# Patient Record
Sex: FEMALE | Race: WHITE | NOT HISPANIC OR LATINO | Employment: UNEMPLOYED | URBAN - METROPOLITAN AREA
[De-identification: names, ages, dates, MRNs, and addresses within clinical notes are randomized per-mention and may not be internally consistent; named-entity substitution may affect disease eponyms.]

---

## 2018-04-24 ENCOUNTER — HISTORICAL (OUTPATIENT)
Dept: ADMINISTRATIVE | Facility: HOSPITAL | Age: 33
End: 2018-04-24

## 2018-04-24 LAB
ABS NEUT (OLG): 4.13 X10(3)/MCL (ref 2.1–9.2)
ALBUMIN SERPL-MCNC: 3.5 GM/DL (ref 3.4–5)
ALBUMIN/GLOB SERPL: 1 RATIO (ref 1–2)
ALP SERPL-CCNC: 67 UNIT/L (ref 45–117)
ALT SERPL-CCNC: 21 UNIT/L (ref 12–78)
APPEARANCE, UA: CLEAR
AST SERPL-CCNC: 11 UNIT/L (ref 15–37)
BACTERIA #/AREA URNS AUTO: ABNORMAL /[HPF]
BASOPHILS # BLD AUTO: 0.03 X10(3)/MCL
BASOPHILS NFR BLD AUTO: 0 %
BILIRUB SERPL-MCNC: 1.1 MG/DL (ref 0.2–1)
BILIRUB UR QL STRIP: NEGATIVE
BILIRUBIN DIRECT+TOT PNL SERPL-MCNC: 0.2 MG/DL
BILIRUBIN DIRECT+TOT PNL SERPL-MCNC: 0.9 MG/DL
BUN SERPL-MCNC: 6 MG/DL (ref 7–18)
CALCIUM SERPL-MCNC: 9 MG/DL (ref 8.5–10.1)
CHLORIDE SERPL-SCNC: 108 MMOL/L (ref 98–107)
CO2 SERPL-SCNC: 27 MMOL/L (ref 21–32)
COLOR UR: YELLOW
CREAT SERPL-MCNC: 0.6 MG/DL (ref 0.6–1.3)
EOSINOPHIL # BLD AUTO: 0.17 X10(3)/MCL
EOSINOPHIL NFR BLD AUTO: 3 %
ERYTHROCYTE [DISTWIDTH] IN BLOOD BY AUTOMATED COUNT: 13.2 % (ref 11.5–14.5)
GLOBULIN SER-MCNC: 4.4 GM/ML (ref 2.3–3.5)
GLUCOSE (UA): NORMAL
GLUCOSE SERPL-MCNC: 100 MG/DL (ref 74–106)
HCT VFR BLD AUTO: 41.4 % (ref 35–46)
HGB BLD-MCNC: 13.7 GM/DL (ref 12–16)
HGB UR QL STRIP: 0.1 MG/DL
HYALINE CASTS #/AREA URNS LPF: ABNORMAL /[LPF]
IMM GRANULOCYTES # BLD AUTO: 0.01 10*3/UL
IMM GRANULOCYTES NFR BLD AUTO: 0 %
KETONES UR QL STRIP: ABNORMAL
LEUKOCYTE ESTERASE UR QL STRIP: 250 LEU/UL
LYMPHOCYTES # BLD AUTO: 1.75 X10(3)/MCL
LYMPHOCYTES NFR BLD AUTO: 27 % (ref 13–40)
MCH RBC QN AUTO: 28.2 PG (ref 26–34)
MCHC RBC AUTO-ENTMCNC: 33.1 GM/DL (ref 31–37)
MCV RBC AUTO: 85.2 FL (ref 80–100)
MONOCYTES # BLD AUTO: 0.36 X10(3)/MCL
MONOCYTES NFR BLD AUTO: 6 % (ref 4–12)
MUCOUS THREADS URNS QL MICRO: ABNORMAL
NEUTROPHILS # BLD AUTO: 4.13 X10(3)/MCL
NEUTROPHILS NFR BLD AUTO: 64 X10(3)/MCL
NITRITE UR QL STRIP: NEGATIVE
PH UR STRIP: 5.5 [PH] (ref 4.5–8)
PLATELET # BLD AUTO: 239 X10(3)/MCL (ref 130–400)
PMV BLD AUTO: 10.3 FL (ref 7.4–10.4)
POTASSIUM SERPL-SCNC: 3.9 MMOL/L (ref 3.5–5.1)
PROT SERPL-MCNC: 7.9 GM/DL (ref 6.4–8.2)
PROT UR QL STRIP: 20 MG/DL
RBC # BLD AUTO: 4.86 X10(6)/MCL (ref 4–5.2)
RBC #/AREA URNS AUTO: ABNORMAL /[HPF]
RHEUMATOID FACT SERPL-ACNC: <10 IU/ML (ref 0–15)
SODIUM SERPL-SCNC: 142 MMOL/L (ref 136–145)
SP GR UR STRIP: 1.02 (ref 1–1.03)
SQUAMOUS #/AREA URNS LPF: >100 /[LPF]
UROBILINOGEN UR STRIP-ACNC: NORMAL
WBC # SPEC AUTO: 6.4 X10(3)/MCL (ref 4.5–11)
WBC #/AREA URNS AUTO: ABNORMAL /HPF

## 2021-05-24 ENCOUNTER — HISTORICAL (OUTPATIENT)
Dept: LAB | Facility: HOSPITAL | Age: 36
End: 2021-05-24

## 2021-05-24 LAB
ABS NEUT (OLG): 4.84 X10(3)/MCL (ref 2.1–9.2)
ALBUMIN SERPL-MCNC: 3.6 GM/DL (ref 3.5–5)
ALBUMIN/GLOB SERPL: 0.9 RATIO (ref 1.1–2)
ALP SERPL-CCNC: 59 UNIT/L (ref 40–150)
ALT SERPL-CCNC: 18 UNIT/L (ref 0–55)
AST SERPL-CCNC: 15 UNIT/L (ref 5–34)
BASOPHILS # BLD AUTO: 0 X10(3)/MCL (ref 0–0.2)
BASOPHILS NFR BLD AUTO: 0 %
BILIRUB SERPL-MCNC: 1.4 MG/DL
BILIRUBIN DIRECT+TOT PNL SERPL-MCNC: 0.4 MG/DL (ref 0–0.5)
BILIRUBIN DIRECT+TOT PNL SERPL-MCNC: 1 MG/DL (ref 0–0.8)
BUN SERPL-MCNC: 8 MG/DL (ref 7–18.7)
CALCIUM SERPL-MCNC: 9.6 MG/DL (ref 8.4–10.2)
CHLORIDE SERPL-SCNC: 106 MMOL/L (ref 98–107)
CHOLEST SERPL-MCNC: 220 MG/DL
CHOLEST/HDLC SERPL: 4 {RATIO} (ref 0–5)
CO2 SERPL-SCNC: 26 MMOL/L (ref 22–29)
CREAT SERPL-MCNC: 0.74 MG/DL (ref 0.55–1.02)
EOSINOPHIL # BLD AUTO: 0.2 X10(3)/MCL (ref 0–0.9)
EOSINOPHIL NFR BLD AUTO: 2 %
ERYTHROCYTE [DISTWIDTH] IN BLOOD BY AUTOMATED COUNT: 13.2 % (ref 11.5–14.5)
ERYTHROCYTE [SEDIMENTATION RATE] IN BLOOD: 13 MM/HR (ref 0–20)
EST. AVERAGE GLUCOSE BLD GHB EST-MCNC: 96.8 MG/DL
GLOBULIN SER-MCNC: 3.9 GM/DL (ref 2.4–3.5)
GLUCOSE SERPL-MCNC: 96 MG/DL (ref 74–100)
HBA1C MFR BLD: 5 %
HCT VFR BLD AUTO: 41.3 % (ref 35–46)
HDLC SERPL-MCNC: 51 MG/DL (ref 35–60)
HGB BLD-MCNC: 13.5 GM/DL (ref 12–16)
IMM GRANULOCYTES # BLD AUTO: 0.03 10*3/UL
IMM GRANULOCYTES NFR BLD AUTO: 0 %
LDLC SERPL CALC-MCNC: 152 MG/DL (ref 50–140)
LYMPHOCYTES # BLD AUTO: 1.7 X10(3)/MCL (ref 0.6–4.6)
LYMPHOCYTES NFR BLD AUTO: 24 %
MCH RBC QN AUTO: 28.1 PG (ref 26–34)
MCHC RBC AUTO-ENTMCNC: 32.7 GM/DL (ref 31–37)
MCV RBC AUTO: 86 FL (ref 80–100)
MONOCYTES # BLD AUTO: 0.4 X10(3)/MCL (ref 0.1–1.3)
MONOCYTES NFR BLD AUTO: 5 %
NEUTROPHILS # BLD AUTO: 4.84 X10(3)/MCL (ref 2.1–9.2)
NEUTROPHILS NFR BLD AUTO: 68 %
NRBC BLD AUTO-RTO: 0 % (ref 0–0.2)
PLATELET # BLD AUTO: 266 X10(3)/MCL (ref 130–400)
PMV BLD AUTO: 9.8 FL (ref 7.4–10.4)
POTASSIUM SERPL-SCNC: 4.1 MMOL/L (ref 3.5–5.1)
PROT SERPL-MCNC: 7.5 GM/DL (ref 6.4–8.3)
RBC # BLD AUTO: 4.8 X10(6)/MCL (ref 4–5.2)
SODIUM SERPL-SCNC: 141 MMOL/L (ref 136–145)
TRIGL SERPL-MCNC: 84 MG/DL (ref 37–140)
TSH SERPL-ACNC: 1.32 UIU/ML (ref 0.35–4.94)
VLDLC SERPL CALC-MCNC: 17 MG/DL
WBC # SPEC AUTO: 7.1 X10(3)/MCL (ref 4.5–11)

## 2021-07-12 ENCOUNTER — HISTORICAL (OUTPATIENT)
Dept: ADMINISTRATIVE | Facility: HOSPITAL | Age: 36
End: 2021-07-12

## 2021-07-12 LAB
ABS NEUT (OLG): 5.54 X10(3)/MCL (ref 2.1–9.2)
BASOPHILS # BLD AUTO: 0 X10(3)/MCL (ref 0–0.2)
BASOPHILS NFR BLD AUTO: 0 %
EOSINOPHIL # BLD AUTO: 0.2 X10(3)/MCL (ref 0–0.9)
EOSINOPHIL NFR BLD AUTO: 2 %
ERYTHROCYTE [DISTWIDTH] IN BLOOD BY AUTOMATED COUNT: 13.1 % (ref 11.5–14.5)
HCT VFR BLD AUTO: 41.3 % (ref 35–46)
HGB BLD-MCNC: 13.7 GM/DL (ref 12–16)
IMM GRANULOCYTES # BLD AUTO: 0.03 10*3/UL
IMM GRANULOCYTES NFR BLD AUTO: 0 %
LYMPHOCYTES # BLD AUTO: 2.3 X10(3)/MCL (ref 0.6–4.6)
LYMPHOCYTES NFR BLD AUTO: 27 %
MCH RBC QN AUTO: 27.7 PG (ref 26–34)
MCHC RBC AUTO-ENTMCNC: 33.2 GM/DL (ref 31–37)
MCV RBC AUTO: 83.6 FL (ref 80–100)
MONOCYTES # BLD AUTO: 0.6 X10(3)/MCL (ref 0.1–1.3)
MONOCYTES NFR BLD AUTO: 6 %
NEUTROPHILS # BLD AUTO: 5.54 X10(3)/MCL (ref 2.1–9.2)
NEUTROPHILS NFR BLD AUTO: 64 %
NRBC BLD AUTO-RTO: 0 % (ref 0–0.2)
PLATELET # BLD AUTO: 261 X10(3)/MCL (ref 130–400)
PMV BLD AUTO: 10.1 FL (ref 7.4–10.4)
RBC # BLD AUTO: 4.94 X10(6)/MCL (ref 4–5.2)
WBC # SPEC AUTO: 8.6 X10(3)/MCL (ref 4.5–11)

## 2022-04-10 ENCOUNTER — HISTORICAL (OUTPATIENT)
Dept: ADMINISTRATIVE | Facility: HOSPITAL | Age: 37
End: 2022-04-10
Payer: MEDICAID

## 2022-04-27 VITALS
SYSTOLIC BLOOD PRESSURE: 122 MMHG | BODY MASS INDEX: 34.4 KG/M2 | HEIGHT: 64 IN | WEIGHT: 201.5 LBS | DIASTOLIC BLOOD PRESSURE: 87 MMHG | OXYGEN SATURATION: 97 %

## 2022-05-04 NOTE — HISTORICAL [facility]
This is a historical note converted from [place]. Formatting and pictures may have been removed.  Please reference [place] for original formatting and attached multimedia. Chief Complaint  Needs pcp  History of Present Illness  33-year-old white female presents to establish care in the internal medicine clinic. ?She has a past medical history of?postural orthostatic tachycardia syndrome,?Lyme disease?treated?over 5 years ago,?rheumatoid arthritis diagnosed about 10 years ago, bipolar disorder type II, history of opioid abuse?currently under the care of a psychiatrist with Suboxone.  Dr. Kedar Flores (psychiatrist): Bipolar disorder II, Hx of opioid abuse; 3 years sober from?Roxicodone, Lortab, Vicodin, oxycodone, etc.  She has not had a Pap smear in about 2 years but says that there is a clinic?she goes to in [place]?where she can get a same-day appointment for a Pap smear.? However, she would like to establish care in the gynecology clinic at this facility.  In regards to her history of rheumatoid arthritis, patient states she was diagnosed and treated by Dr. Nieves (rheumatologist) about 10 years ago at which time?she was started on Plaquenil?and?Mobic.? She discontinued all medications?and shortly thereafter was diagnosed with Lyme disease in [place].? Patient says she traveled?throughout the [facility], and spent a significant amount of time in [place]and?[place]regularly?just prior to her diagnosis.  She is requesting a referral to the dermatology clinic for skin check.  Review of Systems  ????Constitutional: No fever, No chills, No weakness, No fatigue.  ?????Eye: No recent visual problem.  ?????Respiratory: No shortness of breath, No cough, No sputum production, No wheezing.  ?????Cardiovascular: No chest pain, No palpitations, No peripheral edema.  ?????Gastrointestinal: No nausea, No vomiting, No diarrhea, No constipation, No heartburn, No abdominal pain.  ?????Genitourinary: No  dysuria.  ?????Endocrine: No excessive thirst, No polyuria, No cold intolerance, No heat intolerance.  ?????Musculoskeletal: No back pain, No joint pain, No decreased range of motion.  ?????Integumentary: No rash, No pruritus.  ?????Neurologic: Alert and oriented X4, No numbness, No tingling, No headache.  ?????Psychiatric: No anxiety, No depression.  Physical Exam  Vitals & Measurements  T:?37.0? ?C (Oral)? HR:?76(Peripheral)? RR:?18? BP:?119/82?  HT:?162?cm? HT:?162?cm? HT:?162?cm? WT:?91.7?kg? WT:?91.7?kg? WT:?91.7?kg? BMI:?34.94?  General: Alert and oriented, No acute distress.  ?????Eye: Pupils are equal, round and reactive to light, Extraocular movements are intact, Normal conjunctiva.  ?????HENT: Normocephalic, Oral mucosa is moist, No pharyngeal erythema.  ?????Neck: Supple, Non-tender.  ?????Respiratory: Lungs are clear to auscultation, Respirations are non-labored.  ?????Cardiovascular: Normal rate, Regular rhythm, No murmur, Good pulses equal in all extremities, No edema.  ?????Gastrointestinal: Soft, Non-tender, Non-distended, Normal bowel sounds.  ?????Musculoskeletal: Normal range of motion, Normal strength.  ?????Integumentary: Warm, Dry. Diffuse sun damage with hyperpigmentation.  ?????Neurologic: Alert, Oriented.  ?????Cognition and Speech: Oriented, Speech clear and coherent.  ?????Psychiatric: Cooperative, Appropriate mood & affect.  Assessment/Plan  Ordered:  CBC w/ Auto Diff, Routine collect, *Est. 04/24/18 3:00:00 CDT, Blood, Order for future visit, *Est. Stop date 04/24/18 3:00:00 CDT, Lab Collect, Wellness examination, 04/24/18 8:52:00 CDT  Clinic Follow up, *Est. 10/24/18 10:50:00 CDT, Order for future visit, Wellness examination, [facility]  Comprehensive Metabolic Panel, Routine collect, *Est. 04/24/18 3:00:00 CDT, Blood, Order for future visit, *Est. Stop date 04/24/18 3:00:00 CDT, Lab Collect, Wellness examination, 04/24/18 8:52:00 CDT  Cyclic Citrullinated Peptide (CCP) Abs, IgA,  IgG-[facility] [id number], Routine collect, *Est. 04/24/18 3:00:00 CDT, Blood, Order for future visit, *Est. Stop date 04/24/18 3:00:00 CDT, Lab Collect, History of rheumatoid arthritis, 04/24/18 9:12:00 CDT  Internal Referral to Dermatology, Skin check per patient request, *Est. 05/24/18 3:00:00 CDT, Future Visit?, Sun-damaged skin  Internal Referral to Gynecology, Pap smear, *Est. 05/24/18 3:00:00 CDT, Future Visit?, Cervical cancer screening  Rheumatoid Factor Quantitative, Routine collect, *Est. 04/24/18 3:00:00 CDT, Blood, Order for future visit, *Est. Stop date 04/24/18 3:00:00 CDT, Lab Collect, History of rheumatoid arthritis, 04/24/18 9:12:00 CDT  Urinalysis with Microscopic if Indicated, Routine collect, Urine, Order for future visit, *Est. 04/24/18 3:00:00 CDT, *Est. Stop date 04/24/18 3:00:00 CDT, Nurse collect, Wellness examination, 04/24/18 8:52:00 CDT  XR Hip Left 2 Views, Routine, *Est. 04/24/18 3:00:00 CDT, Pain, None, Ambulatory, Rad Type, Order for future visit, Left hip pain, *Est. 04/24/18 3:00:00 CDT  ?  1.? Wellness:?Labs?as above today. Follow-up in 6 months.  2.? History of rheumatoid arthritis:?Rheumatoid factor and anti-CCP today.  3.? Bipolar 2 disorder, history of opioid abuse: Keep scheduled follow-up appointments with psychiatrist, Dr. Flores.  4.? Skin check:?Referral to dermatology clinic?but also advised patient to call her insurance and see what dermatologist are?covered, I will be happy to refer her accordingly.  ?   Screening: Referral to gynecology clinic but patient understands?he can take at least 6 months to get into this clinic and I advised her?to have a Pap smear done with her previous nurse practitioner.  ?  Patient voiced understanding.   Problem List/Past Medical History  Ongoing  History of Lyme disease  Obesity  POTS (postural orthostatic tachycardia syndrome)  Historical  No qualifying data  Procedure/Surgical History  None.  Medications  indomethacin 25 mg oral capsule,  25 mg= 1 cap(s), Oral, TID, PRN,? ?Not taking  L-Methylfolate Formula 15 mg oral capsule, 15 mg= 1 cap(s), Oral, Daily  magnesium 100 mg oral tablet  SUBOXONE 2MG/0.5MG DISSOLVING FILM, 1 EA, SL, Daily  Wellbutrin  mg/12 hours oral tablet, extended release, 150 mg= 1 tab(s), Oral, Daily  Zonegran 100 mg oral capsule, 200 mg= 2 cap(s), Oral, Daily  Allergies  sulfa drugs?(hives)  Social History  Alcohol  Past, 1-2 times per month, 04/24/2018  Employment/School  Employed, Highest education level: University degree(s)., 04/24/2018  Home/Environment  Lives with Alone. Living situation: Home/Independent. Alcohol abuse in household: No. Substance abuse in household: No. Smoker in household: No. Feels unsafe at home: No. Safe place to go: Yes. Family/Friends available for support: Yes. Concern for family members at home: No. Major illness in household: No., 04/24/2018  Nutrition/Health  Vegan, Wants to lose weight: Yes. Sleeping concerns: No. Feels highly stressed: No., 04/24/2018  Substance Abuse  Past, Prescription medications, Started age 27 Years. Stopped age 30 Years., 04/24/2018  Tobacco  Never smoker, 04/17/2016  Family History  Autoimmune disease: Mother.  Clotting disorder.: Father.  Depression.: Father.  Hyperlipidemia.: Mother.  Hypertension.: Mother.  Thyroid cancer.: Mother.      5. ?Left hip pain ?6 months: X-ray today. ?Advised on over-the-counter?analgesics as needed.

## 2022-07-19 RX ORDER — BUPRENORPHINE HYDROCHLORIDE, NALOXONE HYDROCHLORIDE 2; .5 MG/1; MG/1
FILM, SOLUBLE BUCCAL; SUBLINGUAL
COMMUNITY
Start: 2022-05-04 | End: 2023-10-26

## 2022-07-19 RX ORDER — ZONISAMIDE 100 MG/1
300 CAPSULE ORAL DAILY
COMMUNITY
Start: 2022-06-05 | End: 2023-04-26 | Stop reason: ALTCHOICE

## 2022-07-19 RX ORDER — OMEPRAZOLE 40 MG/1
40 CAPSULE, DELAYED RELEASE ORAL DAILY
COMMUNITY
Start: 2022-06-05 | End: 2022-07-20 | Stop reason: SDUPTHER

## 2022-07-19 RX ORDER — HYDROXYCHLOROQUINE SULFATE 200 MG/1
200 TABLET, FILM COATED ORAL 2 TIMES DAILY
COMMUNITY
Start: 2022-03-08 | End: 2022-07-20 | Stop reason: SDUPTHER

## 2022-07-19 RX ORDER — OFLOXACIN 3 MG/ML
SOLUTION AURICULAR (OTIC)
COMMUNITY
Start: 2022-06-01 | End: 2022-07-20 | Stop reason: ALTCHOICE

## 2022-07-19 RX ORDER — TIZANIDINE 4 MG/1
4 TABLET ORAL NIGHTLY
COMMUNITY
Start: 2022-03-08 | End: 2022-07-20 | Stop reason: SDUPTHER

## 2022-07-19 RX ORDER — LEVONORGESTREL AND ETHINYL ESTRADIOL AND ETHINYL ESTRADIOL 150-30(84)
1 KIT ORAL DAILY
COMMUNITY
Start: 2022-06-01 | End: 2023-10-26

## 2022-07-19 RX ORDER — MELOXICAM 15 MG/1
15 TABLET ORAL DAILY
COMMUNITY
Start: 2022-03-08 | End: 2022-07-20 | Stop reason: SDUPTHER

## 2022-07-19 RX ORDER — BUPROPION HYDROCHLORIDE 150 MG/1
150 TABLET ORAL EVERY MORNING
COMMUNITY
Start: 2022-04-26 | End: 2022-07-20 | Stop reason: ALTCHOICE

## 2022-07-19 RX ORDER — ASPIRIN 325 MG
50000 TABLET, DELAYED RELEASE (ENTERIC COATED) ORAL
COMMUNITY
Start: 2022-03-01 | End: 2022-07-20 | Stop reason: SDUPTHER

## 2022-07-19 RX ORDER — LEFLUNOMIDE 20 MG/1
20 TABLET ORAL
COMMUNITY
Start: 2021-12-01 | End: 2022-07-20

## 2022-07-19 RX ORDER — LISDEXAMFETAMINE DIMESYLATE 40 MG/1
40 CAPSULE ORAL EVERY MORNING
COMMUNITY
Start: 2022-04-28 | End: 2023-04-26 | Stop reason: ALTCHOICE

## 2022-07-19 RX ORDER — ROSUVASTATIN CALCIUM 10 MG/1
10 TABLET, COATED ORAL
COMMUNITY
Start: 2021-07-12 | End: 2023-04-26 | Stop reason: ALTCHOICE

## 2022-07-19 RX ORDER — METHYLPHENIDATE HYDROCHLORIDE 10 MG/1
TABLET ORAL
COMMUNITY
Start: 2022-04-28 | End: 2023-10-26

## 2022-07-20 ENCOUNTER — OFFICE VISIT (OUTPATIENT)
Dept: RHEUMATOLOGY | Facility: CLINIC | Age: 37
End: 2022-07-20
Payer: MEDICAID

## 2022-07-20 VITALS
SYSTOLIC BLOOD PRESSURE: 112 MMHG | BODY MASS INDEX: 29.24 KG/M2 | DIASTOLIC BLOOD PRESSURE: 78 MMHG | WEIGHT: 175.5 LBS | OXYGEN SATURATION: 97 % | RESPIRATION RATE: 18 BRPM | HEIGHT: 65 IN | HEART RATE: 104 BPM | TEMPERATURE: 98 F

## 2022-07-20 DIAGNOSIS — M05.9 SEROPOSITIVE RHEUMATOID ARTHRITIS: Primary | ICD-10-CM

## 2022-07-20 DIAGNOSIS — F41.1 GAD (GENERALIZED ANXIETY DISORDER): ICD-10-CM

## 2022-07-20 DIAGNOSIS — Z15.89 MTHFR GENE MUTATION: ICD-10-CM

## 2022-07-20 DIAGNOSIS — U09.9 POST-COVID SYNDROME: ICD-10-CM

## 2022-07-20 DIAGNOSIS — M79.7 FIBROMYALGIA: ICD-10-CM

## 2022-07-20 DIAGNOSIS — F51.01 PRIMARY INSOMNIA: ICD-10-CM

## 2022-07-20 PROCEDURE — 3074F SYST BP LT 130 MM HG: CPT | Mod: CPTII,,, | Performed by: INTERNAL MEDICINE

## 2022-07-20 PROCEDURE — 3074F PR MOST RECENT SYSTOLIC BLOOD PRESSURE < 130 MM HG: ICD-10-PCS | Mod: CPTII,,, | Performed by: INTERNAL MEDICINE

## 2022-07-20 PROCEDURE — 1160F PR REVIEW ALL MEDS BY PRESCRIBER/CLIN PHARMACIST DOCUMENTED: ICD-10-PCS | Mod: CPTII,,, | Performed by: INTERNAL MEDICINE

## 2022-07-20 PROCEDURE — 1159F PR MEDICATION LIST DOCUMENTED IN MEDICAL RECORD: ICD-10-PCS | Mod: CPTII,,, | Performed by: INTERNAL MEDICINE

## 2022-07-20 PROCEDURE — 3078F DIAST BP <80 MM HG: CPT | Mod: CPTII,,, | Performed by: INTERNAL MEDICINE

## 2022-07-20 PROCEDURE — 3008F PR BODY MASS INDEX (BMI) DOCUMENTED: ICD-10-PCS | Mod: CPTII,,, | Performed by: INTERNAL MEDICINE

## 2022-07-20 PROCEDURE — 3078F PR MOST RECENT DIASTOLIC BLOOD PRESSURE < 80 MM HG: ICD-10-PCS | Mod: CPTII,,, | Performed by: INTERNAL MEDICINE

## 2022-07-20 PROCEDURE — 1160F RVW MEDS BY RX/DR IN RCRD: CPT | Mod: CPTII,,, | Performed by: INTERNAL MEDICINE

## 2022-07-20 PROCEDURE — 99214 OFFICE O/P EST MOD 30 MIN: CPT | Mod: S$PBB,,, | Performed by: INTERNAL MEDICINE

## 2022-07-20 PROCEDURE — 1159F MED LIST DOCD IN RCRD: CPT | Mod: CPTII,,, | Performed by: INTERNAL MEDICINE

## 2022-07-20 PROCEDURE — 99214 OFFICE O/P EST MOD 30 MIN: CPT | Mod: PBBFAC | Performed by: INTERNAL MEDICINE

## 2022-07-20 PROCEDURE — 3008F BODY MASS INDEX DOCD: CPT | Mod: CPTII,,, | Performed by: INTERNAL MEDICINE

## 2022-07-20 PROCEDURE — 99214 PR OFFICE/OUTPT VISIT, EST, LEVL IV, 30-39 MIN: ICD-10-PCS | Mod: S$PBB,,, | Performed by: INTERNAL MEDICINE

## 2022-07-20 RX ORDER — IBUPROFEN 600 MG/1
600 TABLET ORAL
COMMUNITY
Start: 2022-06-28 | End: 2022-07-20

## 2022-07-20 RX ORDER — ASPIRIN 325 MG
50000 TABLET, DELAYED RELEASE (ENTERIC COATED) ORAL
Qty: 5 CAPSULE | Refills: 5 | Status: SHIPPED | OUTPATIENT
Start: 2022-07-20 | End: 2024-02-27

## 2022-07-20 RX ORDER — MELOXICAM 15 MG/1
15 TABLET ORAL DAILY
Qty: 30 EACH | Refills: 5 | Status: SHIPPED | OUTPATIENT
Start: 2022-07-20 | End: 2022-09-26

## 2022-07-20 RX ORDER — OMEPRAZOLE 40 MG/1
40 CAPSULE, DELAYED RELEASE ORAL DAILY
Qty: 30 CAPSULE | Refills: 5 | Status: SHIPPED | OUTPATIENT
Start: 2022-07-20 | End: 2023-04-26 | Stop reason: ALTCHOICE

## 2022-07-20 RX ORDER — PENICILLIN V POTASSIUM 500 MG/1
500 TABLET, FILM COATED ORAL 4 TIMES DAILY
COMMUNITY
Start: 2022-06-28 | End: 2022-07-20 | Stop reason: ALTCHOICE

## 2022-07-20 RX ORDER — TIZANIDINE 4 MG/1
4 TABLET ORAL NIGHTLY
Qty: 30 TABLET | Refills: 5 | Status: SHIPPED | OUTPATIENT
Start: 2022-07-20 | End: 2023-04-26 | Stop reason: ALTCHOICE

## 2022-07-20 RX ORDER — METHYLPREDNISOLONE 4 MG/1
TABLET ORAL
COMMUNITY
Start: 2022-06-29 | End: 2022-07-20 | Stop reason: ALTCHOICE

## 2022-07-20 RX ORDER — HYDROXYCHLOROQUINE SULFATE 200 MG/1
200 TABLET, FILM COATED ORAL 2 TIMES DAILY
Qty: 60 TABLET | Refills: 5 | Status: SHIPPED | OUTPATIENT
Start: 2022-07-20 | End: 2023-04-26 | Stop reason: SDUPTHER

## 2022-07-20 NOTE — PROGRESS NOTES
"Subjective:       Patient ID: Bernice Joshi is a 37 y.o. female.    Chief Complaint: Rheumatoid Arthritis and Fibromyalgia    The patient is complaining of joint pain involving the MCP PIP wrist elbow shoulders hips knees and ankles bilaterally.  The pain is 9/10 in intensity dull in quality and continuous.  That is associated with a morning stiffness lasting for more than 60 minutes.  Is also having difficulty maintaining a good night of sleep.  This has been associated with myalgias.  Muscle aches are 9/10 in intensity dull in quality and continuous.  They are associated with fatigue.  No fever no chills no others.      Review of Systems   Constitutional: Negative for appetite change, chills, fever and unexpected weight change.   HENT: Negative for congestion, ear pain, mouth sores, nosebleeds and trouble swallowing.    Eyes: Negative for photophobia, discharge and redness.   Respiratory: Negative for cough, chest tightness and shortness of breath.    Cardiovascular: Negative for chest pain.   Gastrointestinal: Negative for abdominal pain, constipation, diarrhea and vomiting.   Endocrine: Negative.    Genitourinary: Negative for dysuria, genital sores and hematuria.   Musculoskeletal:        As per HPI   Skin: Negative for rash.   Neurological: Negative for weakness and headaches.   Hematological: Bruises/bleeds easily.         Objective:   /78   Pulse 104   Temp 98.3 °F (36.8 °C) (Oral)   Resp 18   Ht 5' 4.5" (1.638 m)   Wt 79.6 kg (175 lb 7.8 oz)   LMP 07/18/2022   SpO2 97%   BMI 29.66 kg/m²      Physical Exam   Constitutional: She is oriented to person, place, and time. She appears well-developed and well-nourished. No distress.   HENT:   Head: Normocephalic and atraumatic.   Right Ear: External ear normal.   Left Ear: External ear normal.   Eyes: Pupils are equal, round, and reactive to light.   Cardiovascular: Normal rate, regular rhythm and normal heart sounds.   Pulmonary/Chest: Breath " sounds normal.   Abdominal: Soft. There is no abdominal tenderness.   Musculoskeletal:      Right shoulder: Tenderness present.      Left shoulder: Tenderness present.      Right elbow: Tenderness present.      Left elbow: Tenderness present.      Right wrist: Tenderness present.      Left wrist: Tenderness present.      Cervical back: Neck supple.      Right hip: Tenderness present.      Left hip: Tenderness present.      Right knee: Tenderness present.      Left knee: Tenderness present.      Right ankle: Tenderness present.      Left ankle: Tenderness present.   Lymphadenopathy:     She has no cervical adenopathy.   Neurological: She is alert and oriented to person, place, and time. She displays normal reflexes. No cranial nerve deficit or sensory deficit. She exhibits normal muscle tone. Coordination normal.   Skin: No rash noted. No erythema.   Vitals reviewed.      Right Side Rheumatological Exam     The patient is tender to palpation of the shoulder, elbow, wrist, knee, 1st PIP, 1st MCP, 2nd PIP, 2nd MCP, 3rd PIP, 3rd MCP, 4th PIP, 4th MCP, 5th PIP, hip, ankle, 1st MTP, 2nd MTP, 3rd MTP, 4th MTP, 5th MTP, 1st toe IP, 2nd toe IP, 3rd toe IP, 4th toe IP and 5th toe IP    Left Side Rheumatological Exam     The patient is tender to palpation of the shoulder, elbow, wrist, knee, 1st PIP, 1st MCP, 2nd PIP, 2nd MCP, 3rd PIP, 3rd MCP, 4th PIP, 4th MCP, 5th PIP, 5th MCP, hip, ankle, 1st MTP, 2nd MTP, 3rd MTP, 4th MTP, 5th MTP, 1st toe IP, 2nd toe IP, 3rd toe IP, 4th toe IP and 5th toe IP.         Completed Fibromyalgia exam 18/18 tender points.  No data to display     Assessment:       1. Seropositive rheumatoid arthritis    2. Fibromyalgia    3. Post-COVID syndrome    4. Primary insomnia    5. KHADIJAH (generalized anxiety disorder)    6. MTHFR gene mutation            Plan:       Problem List Items Addressed This Visit        Immunology/Multi System    Seropositive rheumatoid arthritis - Primary    Relevant Medications     cholecalciferol, vitamin D3, 1,250 mcg (50,000 unit) capsule    hydrOXYchloroQUINE (PLAQUENIL) 200 mg tablet    meloxicam (MOBIC) 15 MG tablet    omeprazole (PRILOSEC) 40 MG capsule    tiZANidine (ZANAFLEX) 4 MG tablet    folic acid-vit B6-vit B12 2.5-25-2 mg (FOLBIC OR EQUIV) 2.5-25-2 mg Tab    Other Relevant Orders    CBC Auto Differential    Comprehensive Metabolic Panel    CRP, High Sensitivity    Vitamin D    Urinalysis    Antinuclear Antibody (MAK), HEp-2, IgG    Ambulatory Referral/consult to [facility]       Orthopedic    Fibromyalgia    Relevant Medications    cholecalciferol, vitamin D3, 1,250 mcg (50,000 unit) capsule    hydrOXYchloroQUINE (PLAQUENIL) 200 mg tablet    meloxicam (MOBIC) 15 MG tablet    omeprazole (PRILOSEC) 40 MG capsule    tiZANidine (ZANAFLEX) 4 MG tablet    folic acid-vit B6-vit B12 2.5-25-2 mg (FOLBIC OR EQUIV) 2.5-25-2 mg Tab    Other Relevant Orders    CBC Auto Differential    Comprehensive Metabolic Panel    CRP, High Sensitivity    Vitamin D    Urinalysis    Antinuclear Antibody (MAK), HEp-2, IgG    Ambulatory Referral/consult to [facility]      Other Visit Diagnoses     Post-COVID syndrome        Relevant Medications    cholecalciferol, vitamin D3, 1,250 mcg (50,000 unit) capsule    hydrOXYchloroQUINE (PLAQUENIL) 200 mg tablet    meloxicam (MOBIC) 15 MG tablet    omeprazole (PRILOSEC) 40 MG capsule    tiZANidine (ZANAFLEX) 4 MG tablet    folic acid-vit B6-vit B12 2.5-25-2 mg (FOLBIC OR EQUIV) 2.5-25-2 mg Tab    Other Relevant Orders    CBC Auto Differential    Comprehensive Metabolic Panel    CRP, High Sensitivity    Vitamin D    Urinalysis    Antinuclear Antibody (MAK), HEp-2, IgG    Ambulatory Referral/consult to [facility]    Primary insomnia        Relevant Medications    cholecalciferol, vitamin D3, 1,250 mcg (50,000 unit) capsule    hydrOXYchloroQUINE (PLAQUENIL) 200 mg tablet     meloxicam (MOBIC) 15 MG tablet    omeprazole (PRILOSEC) 40 MG capsule    tiZANidine (ZANAFLEX) 4 MG tablet    folic acid-vit B6-vit B12 2.5-25-2 mg (FOLBIC OR EQUIV) 2.5-25-2 mg Tab    Other Relevant Orders    CBC Auto Differential    Comprehensive Metabolic Panel    CRP, High Sensitivity    Vitamin D    Urinalysis    Antinuclear Antibody (MAK), HEp-2, IgG    Ambulatory Referral/consult to [facility]    KHADIJAH (generalized anxiety disorder)        Relevant Medications    cholecalciferol, vitamin D3, 1,250 mcg (50,000 unit) capsule    hydrOXYchloroQUINE (PLAQUENIL) 200 mg tablet    meloxicam (MOBIC) 15 MG tablet    omeprazole (PRILOSEC) 40 MG capsule    tiZANidine (ZANAFLEX) 4 MG tablet    folic acid-vit B6-vit B12 2.5-25-2 mg (FOLBIC OR EQUIV) 2.5-25-2 mg Tab    Other Relevant Orders    CBC Auto Differential    Comprehensive Metabolic Panel    CRP, High Sensitivity    Vitamin D    Urinalysis    Antinuclear Antibody (MAK), HEp-2, IgG    Ambulatory Referral/consult to [facility]    MTHFR gene mutation        Relevant Medications    cholecalciferol, vitamin D3, 1,250 mcg (50,000 unit) capsule    hydrOXYchloroQUINE (PLAQUENIL) 200 mg tablet    meloxicam (MOBIC) 15 MG tablet    omeprazole (PRILOSEC) 40 MG capsule    tiZANidine (ZANAFLEX) 4 MG tablet    folic acid-vit B6-vit B12 2.5-25-2 mg (FOLBIC OR EQUIV) 2.5-25-2 mg Tab    Other Relevant Orders    CBC Auto Differential    Comprehensive Metabolic Panel    CRP, High Sensitivity    Vitamin D    Urinalysis    Antinuclear Antibody (MAK), HEp-2, IgG    Ambulatory Referral/consult to [facility]

## 2022-09-12 ENCOUNTER — TELEPHONE (OUTPATIENT)
Dept: RHEUMATOLOGY | Facility: CLINIC | Age: 37
End: 2022-09-12
Payer: MEDICAID

## 2022-09-12 DIAGNOSIS — M53.3 SACROILIAC JOINT PAIN: Primary | ICD-10-CM

## 2022-09-12 NOTE — TELEPHONE ENCOUNTER
Called pt re: PT referral and where to send it  We usually send Medicaid PT to [facility], want to confirm pt agreeable  LVM asking for call back

## 2022-09-19 NOTE — TELEPHONE ENCOUNTER
Called pt again re: where to send PT referral  LVM that unless I hear back that she wants referral sent somewhere else we'll send it to [facility]

## 2022-09-26 ENCOUNTER — TELEPHONE (OUTPATIENT)
Dept: RHEUMATOLOGY | Facility: CLINIC | Age: 37
End: 2022-09-26
Payer: MEDICAID

## 2022-09-26 DIAGNOSIS — M79.7 FIBROMYALGIA: ICD-10-CM

## 2022-09-26 DIAGNOSIS — M06.9 RHEUMATOID ARTHRITIS, INVOLVING UNSPECIFIED SITE, UNSPECIFIED WHETHER RHEUMATOID FACTOR PRESENT: Primary | ICD-10-CM

## 2022-09-26 RX ORDER — PREDNISONE 5 MG/1
5 TABLET ORAL DAILY
Qty: 30 TABLET | Refills: 5 | Status: SHIPPED | OUTPATIENT
Start: 2022-09-26 | End: 2023-04-26 | Stop reason: ALTCHOICE

## 2022-09-26 NOTE — TELEPHONE ENCOUNTER
Also from [facility]: Pt called and stated she had a v/m from [facility] about PT a week ago but she was on vacation at that time. She called today and stated [facility] in [place] is good for her. Pt can be reached @ [phone]

## 2022-09-26 NOTE — TELEPHONE ENCOUNTER
----- Message from Eunice Calle sent at 9/23/2022 11:44 AM CDT -----  Regarding: Pt needs a call  Pt called Clinic stating that she is having SEVERE BRUISING. Pt thinks it's from her medication. Please Advise [phone]    Thank You

## 2022-09-26 NOTE — TELEPHONE ENCOUNTER
Orders for PT faxed to [facility] in [place] at this time. Attempted to contact patient. Left voicemail for return call.

## 2022-09-26 NOTE — TELEPHONE ENCOUNTER
Spoke to patient. Reports tiny, pinpoint bruising to bilateral thigh areas. She also reports multiple, larger, generalized bruises to bilateral lower extremities. States that at her last appointment with Dr. Disla (7/20/22), he wanted to order labs but decided against it when the patient admitted to a recent bought with Covid. She is asking if Dr. Disla would now like to order labs. Orders for labs placed at this time (based on last ov note). Will confer with Dr. Disla. Her next scheduled rheumatology appointment is 4/12/23.

## 2022-09-26 NOTE — TELEPHONE ENCOUNTER
Left message stating that lab orders have been placed and medication changes have been made. Requested callback.

## 2022-09-27 NOTE — TELEPHONE ENCOUNTER
Attempted to contact patient. Left a more detailed message as requested by patient. Medication instructions given and instructed to have labs drawn, as well as to have UA done. Instructed to call if needed.

## 2022-10-10 ENCOUNTER — LAB VISIT (OUTPATIENT)
Dept: LAB | Facility: HOSPITAL | Age: 37
End: 2022-10-10
Attending: INTERNAL MEDICINE
Payer: MEDICAID

## 2022-10-10 DIAGNOSIS — M79.7 FIBROMYALGIA: ICD-10-CM

## 2022-10-10 DIAGNOSIS — M06.9 RHEUMATOID ARTHRITIS, INVOLVING UNSPECIFIED SITE, UNSPECIFIED WHETHER RHEUMATOID FACTOR PRESENT: ICD-10-CM

## 2022-10-10 LAB
ALBUMIN SERPL-MCNC: 3.8 GM/DL (ref 3.5–5)
ALBUMIN/GLOB SERPL: 0.9 RATIO (ref 1.1–2)
ALP SERPL-CCNC: 55 UNIT/L (ref 40–150)
ALT SERPL-CCNC: 20 UNIT/L (ref 0–55)
APPEARANCE UR: CLEAR
AST SERPL-CCNC: 17 UNIT/L (ref 5–34)
BACTERIA #/AREA URNS AUTO: ABNORMAL /HPF
BASOPHILS # BLD AUTO: 0.02 X10(3)/MCL (ref 0–0.2)
BASOPHILS NFR BLD AUTO: 0.2 %
BILIRUB UR QL STRIP.AUTO: NEGATIVE MG/DL
BILIRUBIN DIRECT+TOT PNL SERPL-MCNC: 1 MG/DL
BUN SERPL-MCNC: 17.2 MG/DL (ref 7–18.7)
CALCIUM SERPL-MCNC: 9.8 MG/DL (ref 8.4–10.2)
CHLORIDE SERPL-SCNC: 103 MMOL/L (ref 98–107)
CO2 SERPL-SCNC: 26 MMOL/L (ref 22–29)
COLOR UR AUTO: ABNORMAL
CREAT SERPL-MCNC: 0.76 MG/DL (ref 0.55–1.02)
CRP SERPL HS-MCNC: 4.68 MG/L
EOSINOPHIL # BLD AUTO: 0.05 X10(3)/MCL (ref 0–0.9)
EOSINOPHIL NFR BLD AUTO: 0.6 %
ERYTHROCYTE [DISTWIDTH] IN BLOOD BY AUTOMATED COUNT: 13.1 % (ref 11.5–17)
GFR SERPLBLD CREATININE-BSD FMLA CKD-EPI: >60 MLS/MIN/1.73/M2
GLOBULIN SER-MCNC: 4.2 GM/DL (ref 2.4–3.5)
GLUCOSE SERPL-MCNC: 107 MG/DL (ref 74–100)
GLUCOSE UR QL STRIP.AUTO: NORMAL MG/DL
HCT VFR BLD AUTO: 44.4 % (ref 37–47)
HGB BLD-MCNC: 14.1 GM/DL (ref 12–16)
HYALINE CASTS #/AREA URNS LPF: ABNORMAL /LPF
IMM GRANULOCYTES # BLD AUTO: 0.04 X10(3)/MCL (ref 0–0.04)
IMM GRANULOCYTES NFR BLD AUTO: 0.5 %
KETONES UR QL STRIP.AUTO: ABNORMAL MG/DL
LEUKOCYTE ESTERASE UR QL STRIP.AUTO: NEGATIVE UNIT/L
LYMPHOCYTES # BLD AUTO: 1.03 X10(3)/MCL (ref 0.6–4.6)
LYMPHOCYTES NFR BLD AUTO: 11.6 %
MCH RBC QN AUTO: 28.1 PG (ref 27–31)
MCHC RBC AUTO-ENTMCNC: 31.8 MG/DL (ref 33–36)
MCV RBC AUTO: 88.4 FL (ref 80–94)
MONOCYTES # BLD AUTO: 0.29 X10(3)/MCL (ref 0.1–1.3)
MONOCYTES NFR BLD AUTO: 3.3 %
MUCOUS THREADS URNS QL MICRO: ABNORMAL /LPF
NEUTROPHILS # BLD AUTO: 7.4 X10(3)/MCL (ref 2.1–9.2)
NEUTROPHILS NFR BLD AUTO: 83.8 %
NITRITE UR QL STRIP.AUTO: NEGATIVE
NRBC BLD AUTO-RTO: 0 %
PH UR STRIP.AUTO: 6.5 [PH]
PLATELET # BLD AUTO: 263 X10(3)/MCL (ref 130–400)
PMV BLD AUTO: 10.6 FL (ref 7.4–10.4)
POTASSIUM SERPL-SCNC: 4.1 MMOL/L (ref 3.5–5.1)
PROT SERPL-MCNC: 8 GM/DL (ref 6.4–8.3)
PROT UR QL STRIP.AUTO: NEGATIVE MG/DL
RBC # BLD AUTO: 5.02 X10(6)/MCL (ref 4.2–5.4)
RBC #/AREA URNS AUTO: ABNORMAL /HPF
RBC UR QL AUTO: NEGATIVE UNIT/L
SODIUM SERPL-SCNC: 138 MMOL/L (ref 136–145)
SP GR UR STRIP.AUTO: 1.03
SQUAMOUS #/AREA URNS LPF: ABNORMAL /HPF
UROBILINOGEN UR STRIP-ACNC: NORMAL MG/DL
WBC # SPEC AUTO: 8.9 X10(3)/MCL (ref 4.5–11.5)
WBC #/AREA URNS AUTO: ABNORMAL /HPF

## 2022-10-10 PROCEDURE — 80053 COMPREHEN METABOLIC PANEL: CPT

## 2022-10-10 PROCEDURE — 36415 COLL VENOUS BLD VENIPUNCTURE: CPT

## 2022-10-10 PROCEDURE — 85025 COMPLETE CBC W/AUTO DIFF WBC: CPT

## 2022-10-10 PROCEDURE — 86039 ANTINUCLEAR ANTIBODIES (ANA): CPT

## 2022-10-10 PROCEDURE — 86141 C-REACTIVE PROTEIN HS: CPT

## 2022-10-10 PROCEDURE — 81001 URINALYSIS AUTO W/SCOPE: CPT

## 2022-10-14 LAB
AR ANA INTERPRETIVE COMMENT: NORMAL
AR ANTINUCLEAR ANTIBODY (ANA), HEP-2, IGG: NORMAL

## 2023-04-26 ENCOUNTER — OFFICE VISIT (OUTPATIENT)
Dept: RHEUMATOLOGY | Facility: CLINIC | Age: 38
End: 2023-04-26
Payer: MEDICAID

## 2023-04-26 VITALS
OXYGEN SATURATION: 99 % | WEIGHT: 163.81 LBS | HEART RATE: 85 BPM | TEMPERATURE: 98 F | BODY MASS INDEX: 27.96 KG/M2 | SYSTOLIC BLOOD PRESSURE: 135 MMHG | HEIGHT: 64 IN | DIASTOLIC BLOOD PRESSURE: 92 MMHG

## 2023-04-26 DIAGNOSIS — U09.9 POST-COVID SYNDROME: ICD-10-CM

## 2023-04-26 DIAGNOSIS — Z79.899 HIGH RISK MEDICATION USE: ICD-10-CM

## 2023-04-26 DIAGNOSIS — M79.7 FIBROMYALGIA: ICD-10-CM

## 2023-04-26 DIAGNOSIS — M54.50 CHRONIC MIDLINE LOW BACK PAIN, UNSPECIFIED WHETHER SCIATICA PRESENT: ICD-10-CM

## 2023-04-26 DIAGNOSIS — M05.9 SEROPOSITIVE RHEUMATOID ARTHRITIS: Primary | ICD-10-CM

## 2023-04-26 DIAGNOSIS — G89.29 CHRONIC MIDLINE LOW BACK PAIN, UNSPECIFIED WHETHER SCIATICA PRESENT: ICD-10-CM

## 2023-04-26 DIAGNOSIS — E78.49 OTHER HYPERLIPIDEMIA: ICD-10-CM

## 2023-04-26 DIAGNOSIS — I10 PRIMARY HYPERTENSION: ICD-10-CM

## 2023-04-26 PROBLEM — F32.A DEPRESSIVE DISORDER: Status: ACTIVE | Noted: 2023-04-26

## 2023-04-26 PROBLEM — G90.A POSTURAL ORTHOSTATIC TACHYCARDIA SYNDROME: Status: ACTIVE | Noted: 2023-04-26

## 2023-04-26 PROCEDURE — 3080F PR MOST RECENT DIASTOLIC BLOOD PRESSURE >= 90 MM HG: ICD-10-PCS | Mod: CPTII,,, | Performed by: INTERNAL MEDICINE

## 2023-04-26 PROCEDURE — 3075F SYST BP GE 130 - 139MM HG: CPT | Mod: CPTII,,, | Performed by: INTERNAL MEDICINE

## 2023-04-26 PROCEDURE — 99215 OFFICE O/P EST HI 40 MIN: CPT | Mod: PBBFAC | Performed by: INTERNAL MEDICINE

## 2023-04-26 PROCEDURE — 99417 PR PROLONGED SVC, OUTPT, W/WO DIRECT PT CONTACT,  EA ADDTL 15 MIN: ICD-10-PCS | Mod: S$PBB,,, | Performed by: INTERNAL MEDICINE

## 2023-04-26 PROCEDURE — 1159F PR MEDICATION LIST DOCUMENTED IN MEDICAL RECORD: ICD-10-PCS | Mod: CPTII,,, | Performed by: INTERNAL MEDICINE

## 2023-04-26 PROCEDURE — 1159F MED LIST DOCD IN RCRD: CPT | Mod: CPTII,,, | Performed by: INTERNAL MEDICINE

## 2023-04-26 PROCEDURE — 3008F PR BODY MASS INDEX (BMI) DOCUMENTED: ICD-10-PCS | Mod: CPTII,,, | Performed by: INTERNAL MEDICINE

## 2023-04-26 PROCEDURE — 3080F DIAST BP >= 90 MM HG: CPT | Mod: CPTII,,, | Performed by: INTERNAL MEDICINE

## 2023-04-26 PROCEDURE — 3008F BODY MASS INDEX DOCD: CPT | Mod: CPTII,,, | Performed by: INTERNAL MEDICINE

## 2023-04-26 PROCEDURE — 99215 OFFICE O/P EST HI 40 MIN: CPT | Mod: S$PBB,,, | Performed by: INTERNAL MEDICINE

## 2023-04-26 PROCEDURE — 99417 PROLNG OP E/M EACH 15 MIN: CPT | Mod: S$PBB,,, | Performed by: INTERNAL MEDICINE

## 2023-04-26 PROCEDURE — 99215 PR OFFICE/OUTPT VISIT, EST, LEVL V, 40-54 MIN: ICD-10-PCS | Mod: S$PBB,,, | Performed by: INTERNAL MEDICINE

## 2023-04-26 PROCEDURE — 3075F PR MOST RECENT SYSTOLIC BLOOD PRESS GE 130-139MM HG: ICD-10-PCS | Mod: CPTII,,, | Performed by: INTERNAL MEDICINE

## 2023-04-26 RX ORDER — HYDROXYCHLOROQUINE SULFATE 200 MG/1
200 TABLET, FILM COATED ORAL 2 TIMES DAILY
Qty: 60 TABLET | Refills: 6 | Status: SHIPPED | OUTPATIENT
Start: 2023-04-26 | End: 2023-10-26 | Stop reason: SDUPTHER

## 2023-04-26 RX ORDER — IBUPROFEN 800 MG/1
800 TABLET ORAL
COMMUNITY
End: 2023-04-26 | Stop reason: ALTCHOICE

## 2023-04-26 RX ORDER — LISDEXAMFETAMINE DIMESYLATE 50 MG/1
50 CAPSULE ORAL EVERY MORNING
COMMUNITY
Start: 2023-03-09

## 2023-04-26 RX ORDER — BUPROPION HYDROCHLORIDE 150 MG/1
150 TABLET ORAL EVERY MORNING
COMMUNITY
Start: 2023-01-24 | End: 2023-10-26

## 2023-04-26 RX ORDER — MELOXICAM 15 MG/1
15 TABLET ORAL DAILY
Qty: 30 TABLET | Refills: 2 | Status: SHIPPED | OUTPATIENT
Start: 2023-04-26 | End: 2023-05-26

## 2023-04-26 NOTE — PROGRESS NOTES
Patient ID: [id number]     Chief Complaint: Patient presents today as a new patient of Dr. Campoverde with  (Patient complains of pain in her hands and feet also neck and shoulder. And most of the pain is in her back and shoulders as well)      HPI:     Bernice Joshi is a 38 y.o. female here today for follow up of RA.     She had seen Dr Nieves and Dr Disla. She was diagnosed with RA by Dr Nieves, she lost weight and felt better, she had COVID in 6/2022 and since then joint pain is worse.   C/o pain in back and did not do the PT. Pain in hands, lower back, left jaw, neck and shoulders. Joint pain off and on, worse since she had COVID in 6/2023. Admits swelling in hands. Morning stiffness for few hours. Subaxone helps with pain and she continues to take it.   Admits low grade fevers and fatigue.  She has POTS, better now.   Admits chronic large bruises.     H/o HTN and HLD improved after weight loss. Not taking any meds for HTN or HLD.   Admits color changes in hands and feet, never had ulcers in finger tips or toes. Advised to take pictures next time.     PMH: History of Lyme's Disease, Rheumatoid arthritis, HLD, Postural orthostatic tachycardia hypotension, bipolar disorder, ADHD, substance abuse history - opioid on Suboxone without opioid use for 6.5 yrs, ADHD and Chronic constipation.     Denies history of fevers, rashes, photosensitivity, oral or nasal ulcers, h/o MI, stroke, seizures, h/o PE or DVT, uveitis, malignancies.   Family history of autoimmune disease: mother had RA and paternal grand mother had RA.  Pregnancies:  1 Miscarriages: 1, early first trimester.  Smoking: nonsmoker.     Social History     Tobacco Use   Smoking Status Never   Smokeless Tobacco Never          ----------------------------  Arthritis  COVID  Opiate dependence  Rheumatoid arthritis, unspecified     History reviewed. No pertinent surgical history.    Review of patient's allergies indicates:   Allergen Reactions    Sulfa (sulfonamide  antibiotics)      Other reaction(s): hives       Outpatient Medications Marked as Taking for the 23 encounter (Office Visit) with Jose Cruz Campoverde MD   Medication Sig Dispense Refill    buPROPion (WELLBUTRIN XL) 150 MG TB24 tablet Take 150 mg by mouth every morning.      cholecalciferol, vitamin D3, 1,250 mcg (50,000 unit) capsule Take 1 capsule (50,000 Units total) by mouth every 7 days. 5 capsule 5    methylphenidate HCl (RITALIN) 10 MG tablet SMARTSI Tablet(s) By Mouth Every Evening      SUBOXONE 2-0.5 mg Film SMARTSI.75 Strip(s) Sublingual Daily      VYVANSE 50 mg capsule Take 50 mg by mouth every morning.      [DISCONTINUED] hydrOXYchloroQUINE (PLAQUENIL) 200 mg tablet Take 1 tablet (200 mg total) by mouth 2 (two) times daily. 60 tablet 5    [DISCONTINUED] ibuprofen (ADVIL,MOTRIN) 800 MG tablet Take 800 mg by mouth as needed for Pain.         Social History     Socioeconomic History    Marital status: Single   Tobacco Use    Smoking status: Never    Smokeless tobacco: Never   Substance and Sexual Activity    Alcohol use: Yes     Comment: socially    Drug use: Not Currently     Types: Oxycodone     Comment: 10 years clean from opioid dependency    Sexual activity: Yes     Partners: Male     Birth control/protection: Condom     Comment: BCP        Family History   Problem Relation Age of Onset    Cancer Mother     Hypertension Mother     Hyperlipidemia Mother     Autoimmune disease Mother     Depression Father     Clotting disorder Father         Immunization History   Administered Date(s) Administered    COVID-19 Vaccine 10/22/2021    COVID-19, vector-nr, rS-Ad26, PF ([facility]) 2021    DTP 1986, 1986, 1986, 10/09/1987, 09/10/1991    HIB 1988    Hepatitis B, Pediatric/Adolescent 09/15/1998, 10/30/1998, 1999    MMR 1987, 09/10/1991    OPV 1986, 1986, 1986, 10/09/1987, 09/10/1991    Tdap 09/15/1998, 2021       Patient Care Team:  Estefani  "WINSTON Crotez as PCP - General (Family Medicine)     Subjective:     Constitutional:  Denies chills. Denies fever. Denies night sweats. Denies weight loss.   Ophthalmology: Denies blurred vision. Denies dry eyes. Denies eye pain. Denies Itching and redness.   ENT: Denies oral ulcers. Denies epistaxis. Denies dry mouth. Denies swollen glands.   Endocrine: Denies diabetes. Denies thyroid Problems.   Respiratory: Admits cough intermittent. Denies shortness of breath. Denies shortness of breath with exertion. Denies hemoptysis.   Cardiovascular: Denies chest pain at rest. Denies chest pain with exertion. Denies palpitations.    Gastrointestinal: Admits abdominal pain intermittent. Denies diarrhea. Denies nausea. Denies vomiting. Denies hematemesis or hematochezia. Denies heartburn.  Genitourinary: Denies blood in urine.  Musculoskeletal: See HPI for details  Integumentary: Denies rash. Denies photosensitivity.   Peripheral Vascular: Denies Ulcers of hands and/or feet. Denies Cold extremities.   Neurologic: Denies dizziness. Denies headache.  Denies loss of strength. Denies numbness or tingling.   Psychiatric: Admits depression. Admits anxiety. Denies suicidal/homicidal ideations.      Objective:     BP (!) 135/92 (BP Location: Left arm, Patient Position: Sitting)   Pulse 85   Temp 98.1 °F (36.7 °C)   Ht 5' 4" (1.626 m)   Wt 74.3 kg (163 lb 12.8 oz)   SpO2 99%   BMI 28.12 kg/m²     Physical Exam    General Appearance: alert, pleasant, in no acute distress.  Skin: Skin color, texture, turgor normal. No rashes or lesions.  Eyes:  extraocular movement intact (EOMI), pupils equal, round, reactive to light and accommodation, conjunctiva clear.  ENT: No oral or nasal ulcers.  Neck:  Neck supple. No adenopathy.   Lungs: CTA throughout without crackles, rhonchi, or wheezes.   Heart: RRR w/o murmurs.  No edema.   Abdomen: Soft, non-tender, no masses, rebound or guarding.  Neuro: Alert, oriented, CN II-XII GI, sensory and motor " innervation intact.  Musculoskeletal: No synovitis on exam, no red, warm and swollen joints. No deformities. Tender points on exam.   Psych: Alert, oriented, normal eye contact.      Labs:     Lab Results   Component Value Date    WBC 8.9 10/10/2022    HGB 14.1 10/10/2022    HCT 44.4 10/10/2022     10/10/2022    ALT 20 10/10/2022    AST 17 10/10/2022    BUN 17.2 10/10/2022    CREATININE 0.76 10/10/2022     10/10/2022    K 4.1 10/10/2022    CO2 26 10/10/2022    SEDRATE 13 2021: RF negative.   : RF and anti CCP negative.   2021: CCP negative. RF IgA negative, IgG 12 and IgM 8, normal less than 6. MAK negative.    10/10/22: CBC, CMP ok. UA ok. CRP normal. AMK negative by IFA.     Imagin/9/22: Chest X-ray showed lungs are clear.     Assessment:       ICD-10-CM ICD-9-CM   1. Seropositive rheumatoid arthritis  M05.9 714.0   2. Other hyperlipidemia  E78.49 272.4   3. Primary hypertension  I10 401.9   4. Fibromyalgia  M79.7 729.1   5. Post-COVID syndrome  U09.9 139.8   6. High risk medication use  Z79.899 V58.69   7. Chronic midline low back pain, unspecified whether sciatica present  M54.50 724.2    G89.29 338.29        Plan:     1. Seropositive rheumatoid arthritis: Low titer RF and CCP negative. She had seen Dr Nieves and Dr Disla. She was diagnosed with RA by Dr Nieves, she lost weight and felt better, she had COVID in 2022 and since then joint pain is worse.  No synovitis on exam, arthralgia secondary to fibromyalgia and chronic pain.  No need to change or add any DMARDs today.  - continue with Plaquenil 200 mg b.i.d..  Sent referral to Ophthalmology for eye exam on Plaquenil.  - labs and x-rays today.     2. Other hyperlipidemia and HTN:  Improved after weight loss, currently not taking any medications.  Continue follow-up with PCP.     3. Fibromyalgia: Discussed clinical features of fibromyalgia in detail.  Fibromyalgia is a chronic pain syndrome.  Underlying causes of  fibromyalgia may be numerous.  An underlying nonrestorative sleep pattern, mental and physical stressors play a role in pain perception.  Discussed relationship of pain with sleep.  The brain functions best with a normal restful sleep that includes REM sleep.  Discussed that a nonrestorative sleep pattern may cause a decrease in pain tolerance.  Discussed that over time, this builds up and causes a cycling effect on pain.  This pain is not easily curable with pain medications or narcotics.  It is important to decrease stress levels and improving sleep patterns/hygiene.  A restorative sleep pattern is important in improving the perception of pain.  Medications for fibromyalgia only help 10-20% of the time and come with multiple side effects. FDA recommended medications for fibromyalgia include: lyrica, cymbalta, and savella.  Other medications which have been used in fibromyalgia include amitriptyline, gabapentin, flexeril, gabapentin, and low dose naltresone.   Exercise and a healthy life-style is important in management of this syndrome. We discussed this at length and I have recommended regular low impact exercise, preferably aquatic therapy, as well as cognitive/ behavioral therapy.       4. Post-COVID syndrome:  Arthralgia worse since COVID.  Currently doing okay, we will send referral to physical therapy.  Chest x-ray showed lungs are clear in January 2022, we will repeat chest x-ray in the future visits.     5. High risk medication use:Persons with rheumatoid arthritis, lupus, psoriatic arthritis and other autoimmune diseases are at increased risk of cardiovascular disease including heart attack and stroke. We recommend that all patients with these conditions have annual health maintenance exams including lipid measurements, blood pressure measurements, and smoking cessation counseling when applicable at their primary care provider's office.   -Advised to stay up-to-date on age appropriate vaccinations and  malignancy screening, including yearly skin exams.       6. Chronic midline low back pain:  We will check x-rays of SI joints, sent referral to physical therapy.           Follow up in about 6 months (around 10/26/2023) for with NP. In addition to their scheduled follow up, the patient has also been instructed to follow up on as needed basis.        Total time spent with patient and documentation is more than 60 minutes. All questions were answered to patient's satisfaction and patient verbalized understanding.

## 2023-05-03 ENCOUNTER — TELEPHONE (OUTPATIENT)
Dept: RHEUMATOLOGY | Facility: CLINIC | Age: 38
End: 2023-05-03
Payer: MEDICAID

## 2023-05-03 NOTE — TELEPHONE ENCOUNTER
Called pt no answer left voicemail message to call clinic. This call made in regards to referral for PT was it given to her in hand if so did she find a PT

## 2023-05-30 ENCOUNTER — TELEPHONE (OUTPATIENT)
Dept: RHEUMATOLOGY | Facility: CLINIC | Age: 38
End: 2023-05-30
Payer: MEDICAID

## 2023-05-30 NOTE — TELEPHONE ENCOUNTER
I attempted to call pt no answer left a detailed voicemail message in regards to over due lab work that need to drawn and call clinic for any questions

## 2023-08-29 DIAGNOSIS — O09.529 AMA (ADVANCED MATERNAL AGE) MULTIGRAVIDA 35+: Primary | ICD-10-CM

## 2023-08-29 DIAGNOSIS — F19.11 HISTORY OF DRUG ABUSE: ICD-10-CM

## 2023-09-29 ENCOUNTER — TELEPHONE (OUTPATIENT)
Dept: RHEUMATOLOGY | Facility: CLINIC | Age: 38
End: 2023-09-29
Payer: MEDICAID

## 2023-10-24 PROBLEM — Z79.899 HIGH RISK MEDICATION USE: Status: ACTIVE | Noted: 2023-10-24

## 2023-10-24 NOTE — PROGRESS NOTES
Patient ID: [id number]     Chief Complaint: Pain (Bilateral hand pain ) and Rheumatoid Arthritis and Low iron       HPI:     Brenice Joshi is a 38 y.o. female here today for follow up of RA.     She had seen Dr Nieves and Dr Disla. She was diagnosed with RA by Dr Nieves in 2007, she lost weight and felt better, she had COVID in 6/2022 and since then joint pain is worse.  C/o pain in back and did not do the PT. Pain in hands, lower back, left jaw, neck and shoulders. Joint pain off and on, worse since she had COVID in 6/2023. Admits swelling in hands. Morning stiffness for few hours. Subaxone helps with pain and she continues to take it. Admits low grade fevers and fatigue. She has POTS, better now.  Admits chronic large bruises.     H/o HTN and HLD improved after weight loss. Not taking any meds for HTN or HLD.   Admits color changes in hands and feet, never had ulcers in finger tips or toes. Advised to take pictures next time.     October 2023: Here today for follow up. IUP @ 26 weeks, followed by Fetal maternal Specialist at this time, Dr. Cuenca OB and Dr. Paris. Today she reports joints have been doing really well, since she found out she was pregnant, she decided to stop Plaquenil but would like to discuss restarting while she is pregnant as she and her OB have discussed a plan in case she flares after pregnancy. She does still have joint pain, mainly to her hands, occ to her SI joints if she is on her feet for long periods. She denies red/warm/swollen joints at this time, morning stiffness lasting roughly 1 hour some days- but better since she is pregnancy. She reports previously her flares have always been in her hands and SI joints and occ her jaw at times. Since she is pregnant she has also noticed some CT symptoms. She continues low Dose Subutex with her addiction specialist Dr. Fabian in [place], she has been clean of opoids for 11 years now (reports got addicted when first diagnosed with RA).  She did have an emergency cholecystomy April 2023, readmitted 2 days later for Anasarca.     Dr. Cuenca- OB  Dr. Paris- Fetal Maternal Specialist  Dr. Fabian- Addiction Specialist     PMH: History of Lyme's Disease, Rheumatoid arthritis, HLD, Postural orthostatic tachycardia hypotension, bipolar disorder, ADHD, substance abuse history - opioid on Suboxone without opioid use for 11 yrs, ADHD and Chronic constipation.     Denies history of fevers, rashes, photosensitivity, oral or nasal ulcers, h/o MI, stroke, seizures, h/o PE or DVT, uveitis, Raynaud's, malignancies.   Family history of autoimmune disease: Mother had RA and paternal grand mother had RA.  Pregnancies: 2 Miscarriages: 1, early first trimester.  Smoking: nonsmoker.     Social History     Tobacco Use   Smoking Status Never   Smokeless Tobacco Never          ----------------------------  Arthritis  COVID  Opiate dependence  Rheumatoid arthritis, unspecified     Past Surgical History:   Procedure Laterality Date    CHOLECYSTECTOMY  05/01/2023       Review of patient's allergies indicates:   Allergen Reactions    Sulfa (sulfonamide antibiotics)      Other reaction(s): hives       Outpatient Medications Marked as Taking for the 10/26/23 encounter (Office Visit) with Lora Atwood FNP   Medication Sig Dispense Refill    buprenorphine HCL (SUBUTEX) 2 mg Subl Place 2 mg under the tongue.      cholecalciferol, vitamin D3, 1,250 mcg (50,000 unit) capsule Take 1 capsule (50,000 Units total) by mouth every 7 days. 5 capsule 5    meclizine (ANTIVERT) 12.5 mg tablet Take 12.5 mg by mouth once as needed.         Social History     Socioeconomic History    Marital status: Single   Tobacco Use    Smoking status: Never    Smokeless tobacco: Never   Substance and Sexual Activity    Alcohol use: Yes     Comment: socially    Drug use: Not Currently     Types: Oxycodone     Comment: 10 years clean from opioid dependency    Sexual activity: Yes     Partners:  Male     Birth control/protection: Condom        Family History   Problem Relation Age of Onset    Thyroid cancer Mother     Hypertension Mother     Hyperlipidemia Mother     Autoimmune disease Mother     Depression Father     Clotting disorder Father     Lung cancer Father         Immunization History   Administered Date(s) Administered    COVID-19 Vaccine 10/22/2021    COVID-19, vector-nr, rS-Ad26, PF ([employer]) 03/23/2021    DTP 1986, 1986, 1986, 10/09/1987, 09/10/1991    HIB 08/11/1988    Hepatitis B, Pediatric/Adolescent 09/15/1998, 10/30/1998, 02/19/1999    MMR 08/28/1987, 09/10/1991    OPV 1986, 1986, 1986, 10/09/1987, 09/10/1991    Tdap 09/15/1998, 07/12/2021       Patient Care Team:  Estefani Cortez FNP as PCP - General (Family Medicine)  Jono Galvez MD as Consulting Physician (Maternal and Fetal Medicine)  Ian Gottlieb MD (Obstetrics and Gynecology)     Subjective:     Constitutional:  Denies chills. Denies fever. Denies night sweats. Denies weight loss.   Ophthalmology: Denies blurred vision. Denies dry eyes. Denies eye pain. Denies Itching and redness.   ENT: Denies oral ulcers. Denies epistaxis. Denies dry mouth. Denies swollen glands.   Endocrine: Denies diabetes. Denies thyroid Problems.   Respiratory: Admits cough intermittent. Denies shortness of breath. Denies shortness of breath with exertion. Denies hemoptysis.   Cardiovascular: Denies chest pain at rest. Denies chest pain with exertion. Denies palpitations.    Gastrointestinal: Denies abdominal pain intermittent. Denies diarrhea. Denies nausea. Denies vomiting. Denies hematemesis or hematochezia. Denies heartburn. History of constipation, using Miralax as directed to help   Genitourinary: Denies blood in urine.  Musculoskeletal: See HPI for details  Integumentary: Denies rash. Denies photosensitivity.   Peripheral Vascular: Denies Ulcers of hands and/or feet. Denies Cold extremities.   Neurologic:  "Denies dizziness. Denies headache.  Denies loss of strength. Denies numbness or tingling.   Psychiatric: Denies depression and anxiety- currently controlled. Denies suicidal/homicidal ideations.      Objective:     /68 (BP Location: Right arm, Patient Position: Sitting, BP Method: Large (Automatic))   Pulse 94   Resp 13   Ht 5' 5" (1.651 m)   Wt 83.8 kg (184 lb 12.8 oz)   SpO2 98%   BMI 30.75 kg/m²     Physical Exam    General Appearance: alert, pleasant, in no acute distress.  Skin: Skin color, texture, turgor normal. No rashes or lesions.  Eyes:  extraocular movement intact (EOMI), pupils equal, round, reactive to light and accommodation, conjunctiva clear.  ENT: No oral or nasal ulcers.  Neck:  Neck supple. No adenopathy.   Lungs: CTA throughout without crackles, rhonchi, or wheezes.   Heart: RRR w/o murmurs.  No edema.   Abdomen: Soft, non-tender, no masses, rebound or guarding.  Neuro: Alert, oriented, CN II-XII GI, sensory and motor innervation intact.  Musculoskeletal: No synovitis on exam, no red, warm and swollen joints. No deformities. FROM noted to all joints with no pain.   Psych: Alert, oriented, normal eye contact.      Labs:   2018: RF negative.   2018: RF and anti CCP negative.   2021: CCP negative. RF IgA negative, IgG 12 and IgM 8, normal less than 6. MAK negative.   10/10/22: CBC, CMP ok. UA ok. CRP normal. MAK negative by IFA.   Lab Results   Component Value Date    WBC 8.9 10/10/2022    HGB 14.1 10/10/2022    HCT 44.4 10/10/2022     10/10/2022    ALT 20 10/10/2022    AST 17 10/10/2022    BUN 17.2 10/10/2022    CREATININE 0.76 10/10/2022     10/10/2022    K 4.1 10/10/2022    CO2 26 10/10/2022    SEDRATE 13 2021       Imagin/9/22: Chest X-ray showed lungs are clear.   2023 ECHO LV EF 60-65%  Assessment:       ICD-10-CM ICD-9-CM   1. Seropositive rheumatoid arthritis  M05.9 714.0   2. High risk medication use  Z79.899 V58.69   3. Other hyperlipidemia  " E78.49 272.4   4. Primary hypertension  I10 401.9   5. Fibromyalgia  M79.7 729.1   6. Post-COVID syndrome  U09.9 139.8       Plan:     1. Seropositive rheumatoid arthritis: Low titer RF and CCP negative. She had seen Dr Nieves and Dr Disla. She was diagnosed with RA by Dr Nieves, she lost weight and felt better, she had COVID in 6/2022 and since then joint pain is worse.  No synovitis on exam today   - She previously stopped Plaquenil after she found out she was pregnant however after discussing with her OB she would like to resume Plaquenil 200 mg b.i.d.. She does report occ joint discomfort but overall since pregnancy she states her joints have been feeling good, she is concerned after delivery with flares.   -Resume Plaquenil 200 mg po bid as directed as it is safe to take while pregnant   Sent referral to Ophthalmology for eye exam on Plaquenil.  - She would like repeat lab today while she is here and have faxed over to her OB  - F/U OV in 6 months, sooner if symptoms arise      2. Other hyperlipidemia and HTN  -  Improved after weight loss, currently not taking any medications.    - Continue follow-up with PCP.     3. Fibromyalgia  -  Stable at this time     4. Post-COVID syndrome  -  Stable at this time     5. High risk medication use  -Persons with rheumatoid arthritis, lupus, psoriatic arthritis and other autoimmune diseases are at increased risk of cardiovascular disease including heart attack and stroke. We recommend that all patients with these conditions have annual health maintenance exams including lipid measurements, blood pressure measurements, and smoking cessation counseling when applicable at their primary care provider's office.   -Advised to stay up-to-date on age appropriate vaccinations and malignancy screening, including yearly skin exams.    - UTD with yearly pap  -Continued lab monitoring.      6. Chronic midline low back pain/SI Joint Pain  - Pain currently stable unless on her feet for  long periods    - We will check x-rays of SI joints however did not have done in April after her last apt, we will have to wait until after she delivers the baby, she was sent for physical therapy at her last visit and Rx was lost, she would like to have resent to help with overall mobility before the delivery          No follow-ups on file. In addition to their scheduled follow up, the patient has also been instructed to follow up on as needed basis.        Total time spent with patient and documentation is more than 45 minutes. All questions were answered to patient's satisfaction and patient verbalized understanding.

## 2023-10-26 ENCOUNTER — OFFICE VISIT (OUTPATIENT)
Dept: RHEUMATOLOGY | Facility: CLINIC | Age: 38
End: 2023-10-26
Payer: MEDICAID

## 2023-10-26 VITALS
SYSTOLIC BLOOD PRESSURE: 110 MMHG | HEIGHT: 65 IN | HEART RATE: 94 BPM | OXYGEN SATURATION: 98 % | WEIGHT: 184.81 LBS | DIASTOLIC BLOOD PRESSURE: 68 MMHG | RESPIRATION RATE: 13 BRPM | BODY MASS INDEX: 30.79 KG/M2

## 2023-10-26 DIAGNOSIS — U09.9 POST-COVID SYNDROME: ICD-10-CM

## 2023-10-26 DIAGNOSIS — Z79.899 HIGH RISK MEDICATION USE: ICD-10-CM

## 2023-10-26 DIAGNOSIS — E78.49 OTHER HYPERLIPIDEMIA: ICD-10-CM

## 2023-10-26 DIAGNOSIS — I10 PRIMARY HYPERTENSION: ICD-10-CM

## 2023-10-26 DIAGNOSIS — M79.7 FIBROMYALGIA: ICD-10-CM

## 2023-10-26 DIAGNOSIS — M05.9 SEROPOSITIVE RHEUMATOID ARTHRITIS: Primary | ICD-10-CM

## 2023-10-26 PROCEDURE — 3074F PR MOST RECENT SYSTOLIC BLOOD PRESSURE < 130 MM HG: ICD-10-PCS | Mod: CPTII,,, | Performed by: NURSE PRACTITIONER

## 2023-10-26 PROCEDURE — 3008F BODY MASS INDEX DOCD: CPT | Mod: CPTII,,, | Performed by: NURSE PRACTITIONER

## 2023-10-26 PROCEDURE — 99214 OFFICE O/P EST MOD 30 MIN: CPT | Mod: S$PBB,,, | Performed by: NURSE PRACTITIONER

## 2023-10-26 PROCEDURE — 3074F SYST BP LT 130 MM HG: CPT | Mod: CPTII,,, | Performed by: NURSE PRACTITIONER

## 2023-10-26 PROCEDURE — 3008F PR BODY MASS INDEX (BMI) DOCUMENTED: ICD-10-PCS | Mod: CPTII,,, | Performed by: NURSE PRACTITIONER

## 2023-10-26 PROCEDURE — 99215 OFFICE O/P EST HI 40 MIN: CPT | Mod: PBBFAC | Performed by: NURSE PRACTITIONER

## 2023-10-26 PROCEDURE — 1160F PR REVIEW ALL MEDS BY PRESCRIBER/CLIN PHARMACIST DOCUMENTED: ICD-10-PCS | Mod: CPTII,,, | Performed by: NURSE PRACTITIONER

## 2023-10-26 PROCEDURE — 1159F MED LIST DOCD IN RCRD: CPT | Mod: CPTII,,, | Performed by: NURSE PRACTITIONER

## 2023-10-26 PROCEDURE — 1160F RVW MEDS BY RX/DR IN RCRD: CPT | Mod: CPTII,,, | Performed by: NURSE PRACTITIONER

## 2023-10-26 PROCEDURE — 1159F PR MEDICATION LIST DOCUMENTED IN MEDICAL RECORD: ICD-10-PCS | Mod: CPTII,,, | Performed by: NURSE PRACTITIONER

## 2023-10-26 PROCEDURE — 3078F PR MOST RECENT DIASTOLIC BLOOD PRESSURE < 80 MM HG: ICD-10-PCS | Mod: CPTII,,, | Performed by: NURSE PRACTITIONER

## 2023-10-26 PROCEDURE — 3078F DIAST BP <80 MM HG: CPT | Mod: CPTII,,, | Performed by: NURSE PRACTITIONER

## 2023-10-26 PROCEDURE — 99214 PR OFFICE/OUTPT VISIT, EST, LEVL IV, 30-39 MIN: ICD-10-PCS | Mod: S$PBB,,, | Performed by: NURSE PRACTITIONER

## 2023-10-26 RX ORDER — MECLIZINE HCL 12.5 MG 12.5 MG/1
12.5 TABLET ORAL ONCE AS NEEDED
COMMUNITY
Start: 2023-10-17 | End: 2024-02-27

## 2023-10-26 RX ORDER — BUPRENORPHINE 2 MG/1
2 TABLET SUBLINGUAL DAILY
COMMUNITY
Start: 2023-08-15

## 2023-10-26 RX ORDER — HYDROXYCHLOROQUINE SULFATE 200 MG/1
200 TABLET, FILM COATED ORAL 2 TIMES DAILY
Qty: 60 TABLET | Refills: 6 | Status: SHIPPED | OUTPATIENT
Start: 2023-10-26 | End: 2024-02-27 | Stop reason: SDUPTHER

## 2023-11-02 ENCOUNTER — TELEPHONE (OUTPATIENT)
Dept: RHEUMATOLOGY | Facility: CLINIC | Age: 38
End: 2023-11-02
Payer: MEDICAID

## 2023-11-02 NOTE — TELEPHONE ENCOUNTER
Pt notified of message. Pt verbalized understanding          ----- Message from WINSTON Vicente sent at 10/26/2023  1:19 PM CDT -----  Please advise the patient that all lab are noted to be clinically acceptable, we will continue to monitor at future lab draws. Please fax lab over to Dr. Joellen CUEVAS and Dr. Paris Maternal Fetal Specialist per patient request

## 2024-02-19 ENCOUNTER — TELEPHONE (OUTPATIENT)
Dept: RHEUMATOLOGY | Facility: CLINIC | Age: 39
End: 2024-02-19
Payer: MEDICAID

## 2024-02-19 NOTE — TELEPHONE ENCOUNTER
I attempt to call pt no answer left a detailed  regarding if she can come in to clinic  on 02/27/24 @ 11:30 and to call clinic back with answer

## 2024-02-22 ENCOUNTER — TELEPHONE (OUTPATIENT)
Dept: RHEUMATOLOGY | Facility: CLINIC | Age: 39
End: 2024-02-22
Payer: MEDICAID

## 2024-02-22 NOTE — TELEPHONE ENCOUNTER
----- Message from Eunice Calle sent at 2/21/2024  2:03 PM CST -----  Regarding: Please advise  Pt is in need of a call. Pt can be reached at [phone]  Thank You      Returned patient call but left voicemail message to call clinic back.

## 2024-02-22 NOTE — TELEPHONE ENCOUNTER
I attempt to call pt no answer left a detailed voicemail message regarding if can come in 2/27/24 @ 11:30

## 2024-02-23 NOTE — PROGRESS NOTES
Patient ID: [id number]     Chief Complaint: Seropositive rheumatoid arthritis (Pt stated has joint pain in hands,hips,shoulders and lt. jaw)      HPI:     Bernice Joshi is a 38 y.o. female here today for follow up of RA.     She had seen Dr Nieves and Dr Disla. She was diagnosed with RA by Dr Nieves in , she lost weight and felt better, she had COVID in 2022 and since then joint pain is worse.  C/o pain in back and did not do PT. Pain in hands, lower back, left jaw, neck and shoulders.  Admits swelling in hands. Morning stiffness for few hours. Subaxone helps with pain and she continues to take it, low dose Subutex with her addiction specialist Dr. Fabian in [place], she has been clean of opoids for 11+ years now (reports got addicted when first diagnosed with RA). Admits low grade fevers and fatigue. She has POTS, which is better now.  Admits chronic large bruises.     She does have HTN and HLD improved after weight loss. Not taking any meds for HTN or HLD at this time. Admits color changes in hands and feet, never had ulcers in finger tips or toes. Advised to take pictures next time.  She did have an emergency cholecystomy 2023, readmitted 2 days later for Anasarca.     2024: Here today for follow up.  Received notification from her PCP in regards to flares post delivery of her baby. She reports being off of her medication. Baby is now 6 weeks, she was induced a few weeks early due to swelling and blood pressure issues, she delivered by C/Section. She is not breast feeding at this time- attempted x 1 month but became very stressful so stopped and has been doing much better. She is weaning off Subutex and hoping to be off in the next week few weeks- following with Dr. Fabian. She has been using Ibuprofen 800 mg po prn and requesting a refill. She reports having red/warm/swollen joints, mainly to her hands, having a hard time making bottles to feed her , she also reports pain in  her jaw, hips and upper shoulders have been painful. She is also having tightness in her lower back, having to bend at her knees to help with discomfort- she was starting PT around her last visit and would like to return (needs clearance from GYN as she had Csection prior to starting PT). Morning stiffness lasting several hours, usually by noon pain is better.     Dr. Cuenca- OB  Dr. Paris- Fetal Maternal Specialist  Dr. Fabian- Addiction Specialist     PMH: History of Lyme's Disease, Rheumatoid arthritis, HLD, Postural orthostatic tachycardia hypotension, bipolar disorder, ADHD, substance abuse history - opioid on Suboxone without opioid use for 11 yrs, ADHD and Chronic constipation.     Denies history of fevers, rashes, photosensitivity, oral or nasal ulcers, h/o MI, stroke, seizures, h/o PE or DVT, uveitis, Raynaud's, malignancies.   Family history of autoimmune disease: Mother had RA and paternal grand mother had RA.  Pregnancies: 2 Miscarriages: 1, early first trimester.  Smoking: nonsmoker.     Social History     Tobacco Use   Smoking Status Never   Smokeless Tobacco Never          ----------------------------  Arthritis  COVID  Opiate dependence  Rheumatoid arthritis, unspecified     Past Surgical History:   Procedure Laterality Date    CHOLECYSTECTOMY  05/01/2023       Review of patient's allergies indicates:   Allergen Reactions    Sulfa (sulfonamide antibiotics)      Other reaction(s): hives       Outpatient Medications Marked as Taking for the 2/27/24 encounter (Office Visit) with Lora Atwood FNP   Medication Sig Dispense Refill    buprenorphine HCL (SUBUTEX) 2 mg Subl Place 2 mg under the tongue once daily.      cholecalciferol, vitamin D3, (VITAMIN D3) 125 mcg (5,000 unit) Tab Take 5,000 Units by mouth once daily.      hydroxychloroquine (PLAQUENIL) 200 mg tablet Take 1 tablet (200 mg total) by mouth 2 (two) times daily. 60 tablet 6    [DISCONTINUED] cholecalciferol, vitamin D3,  1,250 mcg (50,000 unit) capsule Take 1 capsule (50,000 Units total) by mouth every 7 days. 5 capsule 5       Social History     Socioeconomic History    Marital status: Single   Tobacco Use    Smoking status: Never    Smokeless tobacco: Never   Substance and Sexual Activity    Alcohol use: Yes     Comment: socially    Drug use: Not Currently     Types: Oxycodone     Comment: 10 years clean from opioid dependency    Sexual activity: Yes     Partners: Male     Birth control/protection: Condom        Family History   Problem Relation Age of Onset    Thyroid cancer Mother     Hypertension Mother     Hyperlipidemia Mother     Autoimmune disease Mother     Depression Father     Clotting disorder Father     Lung cancer Father         Immunization History   Administered Date(s) Administered    COVID-19 MRNA, LN-S PF (MODERNA HALF 0.25 ML DOSE) 10/22/2021    COVID-19 Vaccine 10/22/2021    COVID-19, vector-nr, rS-Ad26, PF ([employer]) 03/23/2021    DTP 1986, 1986, 1986, 10/09/1987, 09/10/1991    HIB 08/11/1988    Hepatitis B, Pediatric/Adolescent 09/15/1998, 10/30/1998, 02/19/1999    MMR 08/28/1987, 09/10/1991    OPV 1986, 1986, 1986, 10/09/1987, 09/10/1991    Tdap 09/15/1998, 07/12/2021       Patient Care Team:  Estefani Cortez FNP as PCP - General (Family Medicine)  Jono Galvez MD as Consulting Physician (Maternal and Fetal Medicine)  Ian Gottlieb MD (Obstetrics and Gynecology)     Subjective:     Constitutional:  Denies chills. Denies fever. Denies night sweats. Denies weight loss.   Ophthalmology: Denies blurred vision. Denies dry eyes. Denies eye pain. Denies Itching and redness.   ENT: Denies oral ulcers. Denies epistaxis. Denies dry mouth. Denies swollen glands.   Endocrine: Denies diabetes. Denies thyroid Problems.   Respiratory: Denies cough. Denies shortness of breath. Denies shortness of breath with exertion. Denies hemoptysis.   Cardiovascular: Denies chest pain at  "rest. Denies chest pain with exertion. Denies palpitations.    Gastrointestinal: Denies abdominal pain. Denies diarrhea. Denies nausea. Denies vomiting. Denies hematemesis or hematochezia. Denies heartburn. History of constipation, using Miralax as directed to help - also s/p  6 weeks ago.   Genitourinary: Denies blood in urine.  Musculoskeletal: See HPI for details  Integumentary: Denies rash. Denies photosensitivity.   Peripheral Vascular: Denies Ulcers of hands and/or feet. Denies Cold extremities.   Neurologic: Denies dizziness. Denies headache.  Denies loss of strength. Admits numbness or tingling to bilateral hands.   Psychiatric: Denies depression and anxiety- currently controlled. Denies suicidal/homicidal ideations.      Objective:     /77 (BP Location: Left arm, Patient Position: Sitting, BP Method: Medium (Automatic))   Temp 97.9 °F (36.6 °C) (Oral)   Resp 20   Ht 5' 5" (1.651 m)   Wt 70.8 kg (156 lb)   SpO2 99%   BMI 25.96 kg/m²     Physical Exam    General Appearance: alert, pleasant, in no acute distress.  Skin: Skin color, texture, turgor normal. No rashes or lesions.  Eyes:  extraocular movement intact (EOMI), pupils equal, round, reactive to light and accommodation, conjunctiva clear.  ENT: No oral or nasal ulcers.  Neck:  Neck supple. No adenopathy.   Lungs: CTA throughout without crackles, rhonchi, or wheezes.   Heart: RRR w/o murmurs.  No edema.   Abdomen: Soft, non-tender, no masses, rebound or guarding.  Neuro: Alert, oriented, CN II-XII GI, sensory and motor innervation intact.  Musculoskeletal: Mild synovitis to few bilateral PIPs on exam, no red, warm and swollen joints, no pain to bilateral MCPs, FROM with no pain to bilateral wrists, elbows and shoulders, tender points noted to upper trap area. No deformities. FROM noted to bilateral knees with no pain or swelling, no pain to ankles or MTPs. - SLE bilaterally, mild tenderness with [place] on left, right ok.   Psych: " Alert, oriented, normal eye contact.      Labs:   2018: RF negative.   2018: RF and anti CCP negative.   2021: CCP negative. RF IgA negative, IgG 12 and IgM 8, normal less than 6. MAK negative.   10/10/22: CBC, CMP ok. UA ok. CRP normal. MAK negative by IFA.   10/26/2023 CMP BUN 6.3 otherwise ok. CBC overall ok. CRP 7.80 (<5), Sed Rate 13 (<13)    Lab Results   Component Value Date    WBC 8.39 10/26/2023    HGB 12.1 10/26/2023    HCT 36.7 (L) 10/26/2023     10/26/2023    ALT 15 10/26/2023    AST 15 10/26/2023    BUN 6.3 (L) 10/26/2023    CREATININE 0.61 10/26/2023     10/26/2023    K 4.1 10/26/2023    CO2 23 10/26/2023    CRP 7.80 (H) 10/26/2023    SEDRATE 13 10/26/2023       Imagin/9/22: Chest X-ray showed lungs are clear.   2023 ECHO LV EF 60-65%  Assessment:       ICD-10-CM ICD-9-CM   1. Seropositive rheumatoid arthritis  M05.9 714.0   2. High risk medication use  Z79.899 V58.69   3. Other hyperlipidemia  E78.49 272.4   4. Primary hypertension  I10 401.9   5. Fibromyalgia  M79.7 729.1   6. Chronic midline low back pain, unspecified whether sciatica present  M54.50 724.2    G89.29 338.29       Plan:     1. Seropositive rheumatoid arthritis  Low titer RF and CCP negative. She had seen Dr Nieves and Dr Disla. She was diagnosed with RA by Dr Nieves, she lost weight and felt better, she had COVID in 2022 and since then joint pain is worse.  Today on exam, mild synovitis noted to several bilateral PIPs.      - She was restarted on her Plaquenil 200 mg b.i.d. ather last visit but reports did not restart until mid 2023 and has not been taking consistently between then and delivery. She is concerned about adding a new medication at this time. Options discussed at length including MTX, and Humira- she is unsure if she wants more kids in the future but is currently only taking Progestin only pill as birth control as when she started, she was still breast feeding, now, no longer nursing.     -Continue Plaquenil 200 mg po bid as directed- she would like to give more time now that she has restarted and taking daily as directed  - Will give Methylprednisolone today 40 mg IM and will send with Prednisone 5 mg po qd to use prn flares -she was advised to take lowest dose, shortest duration- 1-2 tab po qd for 3-5 days- if pain continues, we will consider Humira in the future as she is currently not on any birth control (she was also advised to avoid oral contraceptions- will discuss with GYN IUD/Depo)- if no birth control we will avoid MTX use- she verbalized understanding.   - Sent referral re-sent to Ophthalmology for eye exam on Plaquenil- strongly enc to schedule apt, if she does not hear from their office in 2-3 weeks, she was advised to call and check on status   - Lab today  - Ok to resume PT once cleared from GYN stand point as she did have a  6 weeks ago- she will discuss with them further.   - Refill Ibuprofen 800 mg po qd prn, no other OTC NSAIDs while taking  - Topical Diclofenac to use as directed.   - She will also complete Xrays today as she has not done so yet.      2. Other hyperlipidemia and HTN  -  Improved after weight loss, currently not taking any medications.    - Continue follow-up with PCP, blood pressure at goal today.     3. Fibromyalgia  -  Several tender points noted on exam today, discussed sleep hygiene which can be difficult with a new born, enc relaxation techniques, gentle stretches such as yoga and moist heat prn.      4. High risk medication use  -Persons with rheumatoid arthritis, lupus, psoriatic arthritis and other autoimmune diseases are at increased risk of cardiovascular disease including heart attack and stroke. We recommend that all patients with these conditions have annual health maintenance exams including lipid measurements, blood pressure measurements, and smoking cessation counseling when applicable at their primary care provider's office.    -Advised to stay up-to-date on age appropriate vaccinations and malignancy screening, including yearly skin exams.    - UTD with yearly pap  -Continued lab monitoring.   -Referred back to Ophthalmology for Plaquenil eye exams, discussed importance      5. Chronic midline low back pain/SI Joint Pain  - She will go for Xrays today for evaluation  - She will also discuss with her GYN in regards starting PT, once cleared from GYN standpoint ok to resume, continue stretches and moist heat prn           No follow-ups on file. In addition to their scheduled follow up, the patient has also been instructed to follow up on as needed basis.        Total time spent with patient and documentation is 60 minutes. All questions were answered to patient's satisfaction and patient verbalized understanding.

## 2024-02-27 ENCOUNTER — OFFICE VISIT (OUTPATIENT)
Dept: RHEUMATOLOGY | Facility: CLINIC | Age: 39
End: 2024-02-27
Payer: MEDICAID

## 2024-02-27 ENCOUNTER — HOSPITAL ENCOUNTER (OUTPATIENT)
Dept: RADIOLOGY | Facility: HOSPITAL | Age: 39
Discharge: HOME | End: 2024-02-27
Attending: NURSE PRACTITIONER
Payer: MEDICAID

## 2024-02-27 VITALS
RESPIRATION RATE: 20 BRPM | DIASTOLIC BLOOD PRESSURE: 77 MMHG | HEIGHT: 65 IN | TEMPERATURE: 98 F | OXYGEN SATURATION: 99 % | SYSTOLIC BLOOD PRESSURE: 128 MMHG | BODY MASS INDEX: 31.65 KG/M2 | WEIGHT: 190 LBS

## 2024-02-27 DIAGNOSIS — M05.9 SEROPOSITIVE RHEUMATOID ARTHRITIS: ICD-10-CM

## 2024-02-27 DIAGNOSIS — Z79.899 HIGH RISK MEDICATION USE: ICD-10-CM

## 2024-02-27 DIAGNOSIS — M54.50 CHRONIC MIDLINE LOW BACK PAIN, UNSPECIFIED WHETHER SCIATICA PRESENT: ICD-10-CM

## 2024-02-27 DIAGNOSIS — I10 PRIMARY HYPERTENSION: ICD-10-CM

## 2024-02-27 DIAGNOSIS — M79.7 FIBROMYALGIA: ICD-10-CM

## 2024-02-27 DIAGNOSIS — E78.49 OTHER HYPERLIPIDEMIA: ICD-10-CM

## 2024-02-27 DIAGNOSIS — M05.9 SEROPOSITIVE RHEUMATOID ARTHRITIS: Primary | ICD-10-CM

## 2024-02-27 DIAGNOSIS — G89.29 CHRONIC MIDLINE LOW BACK PAIN, UNSPECIFIED WHETHER SCIATICA PRESENT: ICD-10-CM

## 2024-02-27 PROCEDURE — 73502 X-RAY EXAM HIP UNI 2-3 VIEWS: CPT | Mod: TC,RT

## 2024-02-27 PROCEDURE — 99215 OFFICE O/P EST HI 40 MIN: CPT | Mod: S$PBB,,, | Performed by: NURSE PRACTITIONER

## 2024-02-27 PROCEDURE — 1159F MED LIST DOCD IN RCRD: CPT | Mod: CPTII,,, | Performed by: NURSE PRACTITIONER

## 2024-02-27 PROCEDURE — 73130 X-RAY EXAM OF HAND: CPT | Mod: TC,LT

## 2024-02-27 PROCEDURE — 73502 X-RAY EXAM HIP UNI 2-3 VIEWS: CPT | Mod: TC,LT

## 2024-02-27 PROCEDURE — 73130 X-RAY EXAM OF HAND: CPT | Mod: TC,RT

## 2024-02-27 PROCEDURE — 3074F SYST BP LT 130 MM HG: CPT | Mod: CPTII,,, | Performed by: NURSE PRACTITIONER

## 2024-02-27 PROCEDURE — 73560 X-RAY EXAM OF KNEE 1 OR 2: CPT | Mod: TC,RT

## 2024-02-27 PROCEDURE — 72114 X-RAY EXAM L-S SPINE BENDING: CPT | Mod: TC

## 2024-02-27 PROCEDURE — 72200 X-RAY EXAM SI JOINTS: CPT | Mod: TC

## 2024-02-27 PROCEDURE — 73560 X-RAY EXAM OF KNEE 1 OR 2: CPT | Mod: TC,LT

## 2024-02-27 PROCEDURE — 3008F BODY MASS INDEX DOCD: CPT | Mod: CPTII,,, | Performed by: NURSE PRACTITIONER

## 2024-02-27 PROCEDURE — 99215 OFFICE O/P EST HI 40 MIN: CPT | Mod: PBBFAC,25 | Performed by: NURSE PRACTITIONER

## 2024-02-27 PROCEDURE — 96372 THER/PROPH/DIAG INJ SC/IM: CPT | Mod: PBBFAC

## 2024-02-27 PROCEDURE — 3078F DIAST BP <80 MM HG: CPT | Mod: CPTII,,, | Performed by: NURSE PRACTITIONER

## 2024-02-27 RX ORDER — METHYLPREDNISOLONE ACETATE 40 MG/ML
40 INJECTION, SUSPENSION INTRA-ARTICULAR; INTRALESIONAL; INTRAMUSCULAR; SOFT TISSUE
Status: COMPLETED | OUTPATIENT
Start: 2024-02-27 | End: 2024-02-27

## 2024-02-27 RX ORDER — IBUPROFEN 800 MG/1
800 TABLET ORAL 3 TIMES DAILY
Qty: 30 TABLET | Refills: 2 | Status: SHIPPED | OUTPATIENT
Start: 2024-02-27

## 2024-02-27 RX ORDER — ACETAMINOPHEN 500 MG
5000 TABLET ORAL DAILY
COMMUNITY

## 2024-02-27 RX ORDER — PREDNISONE 5 MG/1
TABLET ORAL
Qty: 30 TABLET | Refills: 0 | Status: SHIPPED | OUTPATIENT
Start: 2024-02-27 | End: 2024-04-24

## 2024-02-27 RX ORDER — DICLOFENAC SODIUM 10 MG/G
2 GEL TOPICAL 4 TIMES DAILY
Qty: 100 G | Refills: 5 | Status: SHIPPED | OUTPATIENT
Start: 2024-02-27

## 2024-02-27 RX ORDER — HYDROXYCHLOROQUINE SULFATE 200 MG/1
200 TABLET, FILM COATED ORAL 2 TIMES DAILY
Qty: 60 TABLET | Refills: 6 | Status: SHIPPED | OUTPATIENT
Start: 2024-02-27 | End: 2024-04-24 | Stop reason: SDUPTHER

## 2024-02-27 RX ADMIN — METHYLPREDNISOLONE ACETATE 40 MG: 40 INJECTION, SUSPENSION INTRA-ARTICULAR; INTRALESIONAL; INTRAMUSCULAR; SOFT TISSUE at 01:02

## 2024-03-01 ENCOUNTER — TELEPHONE (OUTPATIENT)
Dept: RHEUMATOLOGY | Facility: CLINIC | Age: 39
End: 2024-03-01
Payer: MEDICAID

## 2024-03-01 NOTE — TELEPHONE ENCOUNTER
Spoke to pt inform of message.pt verbalized understanding                      ----- Message from WINSTON Vicente sent at 2/29/2024  3:52 PM CST -----  Please advise the patient that all lab are noted to be essentially WNL and clinically acceptable, we will continue to monitor at future lab draws please advise the following x-rays: SI joint, left and right knee, left and right hand, left and right hip and lumbar spine all noted to be within normal limits.

## 2024-04-24 ENCOUNTER — OFFICE VISIT (OUTPATIENT)
Dept: RHEUMATOLOGY | Facility: CLINIC | Age: 39
End: 2024-04-24
Payer: MEDICAID

## 2024-04-24 VITALS
HEART RATE: 95 BPM | HEIGHT: 65 IN | TEMPERATURE: 98 F | OXYGEN SATURATION: 99 % | BODY MASS INDEX: 30.69 KG/M2 | DIASTOLIC BLOOD PRESSURE: 78 MMHG | WEIGHT: 184.19 LBS | SYSTOLIC BLOOD PRESSURE: 112 MMHG | RESPIRATION RATE: 18 BRPM

## 2024-04-24 DIAGNOSIS — E78.49 OTHER HYPERLIPIDEMIA: ICD-10-CM

## 2024-04-24 DIAGNOSIS — M05.9 SEROPOSITIVE RHEUMATOID ARTHRITIS: Primary | ICD-10-CM

## 2024-04-24 DIAGNOSIS — G89.29 CHRONIC MIDLINE LOW BACK PAIN, UNSPECIFIED WHETHER SCIATICA PRESENT: ICD-10-CM

## 2024-04-24 DIAGNOSIS — Z79.899 HIGH RISK MEDICATION USE: ICD-10-CM

## 2024-04-24 DIAGNOSIS — M54.50 CHRONIC MIDLINE LOW BACK PAIN, UNSPECIFIED WHETHER SCIATICA PRESENT: ICD-10-CM

## 2024-04-24 DIAGNOSIS — I10 PRIMARY HYPERTENSION: ICD-10-CM

## 2024-04-24 PROCEDURE — 1159F MED LIST DOCD IN RCRD: CPT | Mod: CPTII,,, | Performed by: INTERNAL MEDICINE

## 2024-04-24 PROCEDURE — 3074F SYST BP LT 130 MM HG: CPT | Mod: CPTII,,, | Performed by: INTERNAL MEDICINE

## 2024-04-24 PROCEDURE — G2211 COMPLEX E/M VISIT ADD ON: HCPCS | Mod: S$PBB,,, | Performed by: INTERNAL MEDICINE

## 2024-04-24 PROCEDURE — 3078F DIAST BP <80 MM HG: CPT | Mod: CPTII,,, | Performed by: INTERNAL MEDICINE

## 2024-04-24 PROCEDURE — 3008F BODY MASS INDEX DOCD: CPT | Mod: CPTII,,, | Performed by: INTERNAL MEDICINE

## 2024-04-24 PROCEDURE — 99215 OFFICE O/P EST HI 40 MIN: CPT | Mod: S$PBB,,, | Performed by: INTERNAL MEDICINE

## 2024-04-24 PROCEDURE — 99214 OFFICE O/P EST MOD 30 MIN: CPT | Mod: PBBFAC | Performed by: INTERNAL MEDICINE

## 2024-04-24 RX ORDER — HYDROXYCHLOROQUINE SULFATE 200 MG/1
200 TABLET, FILM COATED ORAL 2 TIMES DAILY
Qty: 60 TABLET | Refills: 6 | Status: SHIPPED | OUTPATIENT
Start: 2024-04-24

## 2024-04-24 RX ORDER — METHYLPHENIDATE HYDROCHLORIDE 20 MG/1
10 TABLET ORAL DAILY
COMMUNITY
Start: 2024-04-09

## 2024-04-24 RX ORDER — MAGNESIUM 250 MG
1 TABLET ORAL ONCE
COMMUNITY

## 2024-04-24 RX ORDER — MELOXICAM 15 MG/1
15 TABLET ORAL DAILY PRN
Qty: 30 TABLET | Refills: 3 | Status: SHIPPED | OUTPATIENT
Start: 2024-04-24

## 2024-04-24 RX ORDER — ONDANSETRON 4 MG/1
4 TABLET, ORALLY DISINTEGRATING ORAL EVERY 8 HOURS PRN
COMMUNITY
Start: 2024-03-19

## 2024-04-24 NOTE — PROGRESS NOTES
Patient ID: [id number]     Chief Complaint: Seropositive rheumatoid arthritis (No new changes from previous visit. States she is feeling better with joint pain. )      HPI:     Bernice Joshi is a 39 y.o. female here today for follow up of RA.     She had seen Dr Nieves and Dr Disla. She was diagnosed with RA by Dr Nieves in 2007, she lost weight and felt better, she had COVID in 6/2022 and since then joint pain is worse.  C/o pain in back and did not do PT. Pain in hands, lower back, left jaw, neck and shoulders.  Admits swelling in hands. Morning stiffness for few hours. Subaxone helps with pain and she continues to take it, low dose Subutex with her addiction specialist Dr. Fabian in [place], she has been clean of opoids for 11+ years now (reports got addicted when first diagnosed with RA). Admits low grade fevers and fatigue. She has POTS, which is better now.  Admits chronic large bruises.     She does have HTN and HLD improved after weight loss. Not taking any meds for HTN or HLD at this time. Admits color changes in hands and feet, never had ulcers in finger tips or toes. Advised to take pictures next time.  She did have an emergency cholecystomy April 2023, readmitted 2 days later for Anasarca.     4/2024:   Overall she is doing well today.  She denies any joint pain today however does say that her back is hurting.  She endorses morning stiffness on the baby wakes her up approximately 5:00 p.m. at which time she sometimes 6 Motrin.  Does also enjoys back shoulder and jaw pain at times.  Takes Motrin proximally 2 to 3 times a week however overall doing well on Plaquenil.  Instructed her she continues to have flares it might be time to start biologics and there was some biologic son are safe during pregnancy.  She is amenable to this.  She is also requesting a repeat order for physical therapy.    Dr. Cuenca- OB  Dr. Paris- Fetal Maternal Specialist  Dr. Fabian- Addiction Specialist     PMH: History  of Lyme's Disease, Rheumatoid arthritis, HLD, Postural orthostatic tachycardia hypotension, bipolar disorder, ADHD, substance abuse history - opioid on Suboxone without opioid use for 11 yrs, ADHD and Chronic constipation.     Denies history of fevers, rashes, photosensitivity, oral or nasal ulcers, h/o MI, stroke, seizures, h/o PE or DVT, uveitis, Raynaud's, malignancies.   Family history of autoimmune disease: Mother had RA and paternal grand mother had RA.  Pregnancies: 2 Miscarriages: 1, early first trimester.  Smoking: nonsmoker.     Social History     Tobacco Use   Smoking Status Never   Smokeless Tobacco Never          -------------------------------------    Arthritis    COVID    Opiate dependence    Rheumatoid arthritis, unspecified        Past Surgical History:   Procedure Laterality Date     SECTION      CHOLECYSTECTOMY  2023       Review of patient's allergies indicates:   Allergen Reactions    Sulfa (sulfonamide antibiotics)      Other reaction(s): hives       Current Outpatient Medications   Medication Sig Dispense Refill    cholecalciferol, vitamin D3, (VITAMIN D3) 125 mcg (5,000 unit) Tab Take 5,000 Units by mouth once daily.      diclofenac sodium (VOLTAREN) 1 % Gel Apply 2 g topically 4 (four) times daily. 100 g 5    ibuprofen (ADVIL,MOTRIN) 800 MG tablet Take 1 tablet (800 mg total) by mouth 3 (three) times daily. 30 tablet 2    magnesium 250 mg Tab Take 1 tablet by mouth once.      methylphenidate HCl (RITALIN) 20 MG tablet Take 10 mg by mouth Daily.      ondansetron (ZOFRAN-ODT) 4 MG TbDL Take 4 mg by mouth every 8 (eight) hours as needed.      VYVANSE 50 mg capsule Take 50 mg by mouth every morning.      hydroxychloroquine (PLAQUENIL) 200 mg tablet Take 1 tablet (200 mg total) by mouth 2 (two) times daily. 60 tablet 6    meloxicam (MOBIC) 15 MG tablet Take 1 tablet (15 mg total) by mouth daily as needed for Pain. 30 tablet 3     No current facility-administered medications for  this visit.       Social History     Socioeconomic History    Marital status: Single   Tobacco Use    Smoking status: Never    Smokeless tobacco: Never   Substance and Sexual Activity    Alcohol use: Not Currently     Comment: socially    Drug use: Not Currently     Types: Oxycodone     Comment: 10 years clean from opioid dependency    Sexual activity: Yes     Partners: Male     Birth control/protection: Condom     Social Determinants of Health     Food Insecurity: No Food Insecurity (1/17/2024)    [facility] [employer] [facility]    Hunger Vital Sign     Worried About Running Out of Food in the Last Year: Never true     Ran Out of Food in the Last Year: Never true   Transportation Needs: No Transportation Needs (1/17/2024)    Received from [facility], [employer] [facility]    PRAPARE - Transportation     Lack of Transportation (Medical): No     Lack of Transportation (Non-Medical): No   Housing Stability: Low Risk  (1/17/2024)    Received from [facility], [employer] [facility]    Housing Stability Vital Sign     Unable to Pay for Housing in the Last Year: No     Number of Places Lived in the Last Year: 1     In the last 12 months, was there a time when you did not have a steady place to sleep or slept in a shelter (including now)?: No        Family History   Problem Relation Name Age of Onset    Thyroid cancer Mother      Hypertension Mother      Hyperlipidemia Mother      Autoimmune disease Mother      Depression Father      Clotting disorder Father      Lung cancer Father           Immunization History   Administered Date(s) Administered    COVID-19 MRNA, LN-S PF (MODERNA HALF 0.25 ML DOSE) 10/22/2021    COVID-19 Vaccine 10/22/2021    COVID-19, vector-nr, rS-Ad26, PF (Nancy) 2021    DTP 1986, 1986, 1986, 10/09/1987, 09/10/1991    HIB 1988    Hepatitis B, Pediatric/Adolescent 09/15/1998, 10/30/1998, 1999    MMR 1987, 09/10/1991    OPV 1986, 1986, 1986, 10/09/1987, 09/10/1991    Td (ADULT) 09/15/1998    Tdap 09/15/1998, 2021       Patient Care Team:  Estefani Cortez FNP as PCP - General (Family Medicine)  Jono Galvez MD as Consulting Physician (Maternal and Fetal Medicine)  Ian Gottlieb MD (Obstetrics and Gynecology)     Subjective:     Constitutional:  Denies chills. Denies fever. Denies night sweats. Denies weight loss.   Ophthalmology: Denies blurred vision. Denies dry eyes. Denies eye pain. Denies Itching and redness.   ENT: Denies oral ulcers. Denies epistaxis. Denies dry mouth. Denies swollen glands.   Endocrine: Denies diabetes. Denies thyroid Problems.   Respiratory: Denies cough. Denies shortness of breath. Denies shortness of breath with exertion. Denies hemoptysis.   Cardiovascular: Denies chest pain at rest. Denies chest pain with exertion. Denies palpitations.    Gastrointestinal: Denies abdominal pain. Denies diarrhea. Denies nausea. Denies vomiting. Denies hematemesis or hematochezia. Denies heartburn. History of constipation, using Miralax as directed to help - also s/p  13 weeks ago.   Genitourinary: Denies blood in urine.  Musculoskeletal: See HPI for details  Integumentary: Denies rash. Denies photosensitivity.   Peripheral Vascular: Denies Ulcers of hands and/or feet. Denies Cold extremities.   Neurologic: Denies dizziness. Denies headache.  Denies loss of strength.  Denies numbness or tingling to bilateral hands.   Psychiatric: Denies depression and anxiety- currently controlled.  "Denies suicidal/homicidal ideations.      Objective:     /78 (BP Location: Left arm, Patient Position: Sitting, BP Method: Medium (Manual))   Pulse 95   Temp 97.8 °F (36.6 °C) (Oral)   Resp 18   Ht 5' 5" (1.651 m)   Wt 83.6 kg (184 lb 3.2 oz)   SpO2 99%   Breastfeeding Unknown   BMI 30.65 kg/m²     Physical Exam    General Appearance: alert, pleasant, in no acute distress.  Skin: Skin color, texture, turgor normal. No rashes or lesions.  Eyes:  extraocular movement intact (EOMI), conjunctiva clear.  ENT: No oral or nasal ulcers.  Neck:  Neck supple. No adenopathy.   Lungs: CTA throughout without crackles, rhonchi, or wheezes.   Heart: RRR w/o murmurs.  No edema.   Abdomen: Soft, non-tender  Neuro: Alert, oriented, sensory and motor innervation intact.  Musculoskeletal:  Mild synovitis in left 1st MCP otherwise joint exam within normal limits.  Range of motion within normal limits without tenderness to palpation.  Psych: Alert, oriented, normal eye contact.      Labs:   2018: RF negative.   2018: RF and anti CCP negative.   2021: CCP negative. RF IgA negative, IgG 12 and IgM 8, normal less than 6. MAK negative.   10/10/22: CBC, CMP ok. UA ok. CRP normal. MAK negative by IFA.   10/26/2023 CMP BUN 6.3 otherwise ok. CBC overall ok. CRP 7.80 (<5), Sed Rate 13 (<13)    Lab Results   Component Value Date    WBC 7.81 2024    HGB 13.2 2024    HCT 40.5 2024     2024    ALT 13 2024    AST 12 2024    BUN 14.2 2024    CREATININE 0.80 2024     2024    K 4.3 2024    CO2 27 2024    CRP 2.90 2024    SEDRATE 8 2024       Imagin/9/22: Chest X-ray showed lungs are clear.   2023 ECHO LV EF 60-65%  Assessment:       ICD-10-CM ICD-9-CM   1. Seropositive rheumatoid arthritis  M05.9 714.0   2. High risk medication use  Z79.899 V58.69   3. Primary hypertension  I10 401.9   4. Other hyperlipidemia  E78.49 272.4   5. Chronic " midline low back pain, unspecified whether sciatica present  M54.50 724.2    G89.29 338.29         Plan:     1. Seropositive rheumatoid arthritis  Low titer RF and CCP negative. She had seen Dr Nieves and Dr Disla. She was diagnosed with RA by Dr Nieves, she lost weight and felt better, she had COVID in 6/2022 and since then joint pain is worse.  Today on exam, mild synovitis noted to several bilateral PIPs.      - She was restarted on her Plaquenil 200 mg b.i.d. ather last visit but reports did not restart until mid December 2023 and has not been taking consistently between then and delivery. She is concerned about adding a new medication at this time. Options discussed at length including MTX, and Humira- she is unsure if she wants more kids in the future but is currently only taking Progestin only pill as birth control as when she started, she was still breast feeding, now, no longer nursing.  Had long conversation on the safety of newer biologic stent pregnancy she is amenable to this.  She is considering possibly having 1 more trial.   -Continue Plaquenil 200 mg po bid as directed- she would like to give more time now that she has restarted and taking daily as directed  - continue Motrin p.r.n.  - Ophthalmology appt 4/29/24  - needs new order for PT  - continue Topical Diclofenac to use as directed.       2. Other hyperlipidemia and HTN  -  Improved after weight loss, currently not taking any medications.    - Continue follow-up with PCP, blood pressure at goal today.     3. Fibromyalgia  -continue PT, new order placed   -currently not on medications for this     4. High risk medication use  -Persons with rheumatoid arthritis, lupus, psoriatic arthritis and other autoimmune diseases are at increased risk of cardiovascular disease including heart attack and stroke. We recommend that all patients with these conditions have annual health maintenance exams including lipid measurements, blood pressure measurements,  and smoking cessation counseling when applicable at their primary care provider's office.   -Advised to stay up-to-date on age appropriate vaccinations and malignancy screening, including yearly skin exams.    - UTD with yearly pap  -Continued lab monitoring.   -Ophthalmology for Plaquenil eye exams, 4/29/24     5. Chronic midline low back pain/SI Joint Pain  - xrays unremarkable          Follow up in about 6 months (around 10/24/2024) for with NP and a year with me. In addition to their scheduled follow up, the patient has also been instructed to follow up on as needed basis.

## 2024-04-29 ENCOUNTER — CLINICAL SUPPORT (OUTPATIENT)
Dept: OPHTHALMOLOGY | Facility: CLINIC | Age: 39
End: 2024-04-29
Payer: MEDICAID

## 2024-04-29 DIAGNOSIS — Z79.899 HIGH RISK MEDICATION USE: Primary | ICD-10-CM

## 2024-04-29 NOTE — PROGRESS NOTES
[facility]     Additional comments: 10-2 for plaquenil          Last edited by Sky Alcala MA on 4/29/2024 10:33 AM.            Assessment /Plan     For exam results, see Encounter Report.    There are no diagnoses linked to this encounter.  Patient states that she noticed vision becoming blurry at a distance when she became pregnant. She delivered her son 3 months ago and her vision is still blurry. She has never worn glasses.

## 2024-05-02 NOTE — PROGRESS NOTES
39 year old new patient complains of blurry vision.  She presented for a HVF only.  10-2  Unreliable OU   Both VF show superior defects.    Will examine the patient, get an OCT 5 line and see if this is real.    Keep appointment in July, 2024.

## 2024-05-24 ENCOUNTER — TELEPHONE (OUTPATIENT)
Dept: RHEUMATOLOGY | Facility: CLINIC | Age: 39
End: 2024-05-24
Payer: MEDICAID

## 2024-05-24 NOTE — TELEPHONE ENCOUNTER
Called and spoke with pt to remind her of labs due. Pt states she has not taken the Mobic much since it was prescribed and will continue to take just ibuprofen as needed which she hasn't had to take much. This is just an FIY.

## 2024-07-16 ENCOUNTER — OFFICE VISIT (OUTPATIENT)
Dept: OPHTHALMOLOGY | Facility: CLINIC | Age: 39
End: 2024-07-16
Payer: MEDICAID

## 2024-07-16 VITALS — HEIGHT: 65 IN | WEIGHT: 184.31 LBS | BODY MASS INDEX: 30.71 KG/M2

## 2024-07-16 DIAGNOSIS — Z79.899 HIGH RISK MEDICATION USE: ICD-10-CM

## 2024-07-16 DIAGNOSIS — M06.9 RHEUMATOID ARTHRITIS, INVOLVING UNSPECIFIED SITE, UNSPECIFIED WHETHER RHEUMATOID FACTOR PRESENT: Primary | ICD-10-CM

## 2024-07-16 PROCEDURE — 92134 CPTRZ OPH DX IMG PST SGM RTA: CPT | Mod: PBBFAC,PN

## 2024-07-16 PROCEDURE — 92134 CPTRZ OPH DX IMG PST SGM RTA: CPT | Mod: PBBFAC,PN | Performed by: OPHTHALMOLOGY

## 2024-07-16 PROCEDURE — 99214 OFFICE O/P EST MOD 30 MIN: CPT | Mod: PBBFAC,PN

## 2024-07-16 RX ORDER — ACETAMINOPHEN 500 MG
1 TABLET ORAL EVERY MORNING
COMMUNITY

## 2024-07-16 RX ORDER — LISDEXAMFETAMINE DIMESYLATE 60 MG/1
60 CAPSULE ORAL EVERY MORNING
COMMUNITY

## 2024-07-16 RX ORDER — PHENYLEPH/TROPICAMIDE IN WATER 2.5 %-1 %
1 DROPS OPHTHALMIC (EYE) ONCE
Status: COMPLETED | OUTPATIENT
Start: 2024-07-16 | End: 2024-07-16

## 2024-07-16 RX ORDER — CEFDINIR 300 MG/1
300 CAPSULE ORAL EVERY 12 HOURS
COMMUNITY
Start: 2024-07-03

## 2024-07-16 RX ADMIN — Medication 1 DROP: at 08:07

## 2024-07-16 NOTE — PROGRESS NOTES
HPI     Eye Exam     Additional comments: Plaquenil eval. 5 line OCT, DFE          Last edited by Mayra Cespedes LPN on 7/16/2024  7:51 AM.        HVF 5/02/24:   OD: 6/12 FL. 9% FP, 0% FN, Scattered superior misses   OS: 0/11 FL, 3% FP, 0% FN, Superior nonspecific misses    OCT MAC 5 Line Raster (07/16/2024)  OD: Normal Foveal Contour, No IRF/SRF  OS: Normal Foveal Contour, No IRF/SRF   Impression: Normal OCT Mac    Assessment /Plan     High Risk Medication Use   Rheumatoid Arthritis   - Started Plaquenil in 2008, off an on for 15 year usage; discontinued a year ago. Plans on restarting soon   - Last HVF Last (5/20/24): With scattered nonspecific superior misses  - Last OCT Mac (7/16/24): NFC, No pathology noted, No IRF/SRF  - No pathology noted on DFE (7/16/24)  - Annual DFE, 5 Line Raster, Optos          RTC on GLENN Day in 1 year for HVF   RTC in 1 year for Optos, DFE, OCT Mac

## 2025-06-23 ENCOUNTER — OFFICE VISIT (OUTPATIENT)
Dept: RHEUMATOLOGY | Facility: CLINIC | Age: 40
End: 2025-06-23
Payer: MEDICAID

## 2025-06-23 VITALS
DIASTOLIC BLOOD PRESSURE: 67 MMHG | SYSTOLIC BLOOD PRESSURE: 101 MMHG | HEIGHT: 65 IN | RESPIRATION RATE: 18 BRPM | OXYGEN SATURATION: 98 % | TEMPERATURE: 98 F | HEART RATE: 104 BPM | WEIGHT: 133.38 LBS | BODY MASS INDEX: 22.22 KG/M2

## 2025-06-23 DIAGNOSIS — D84.821 DRUG-INDUCED IMMUNODEFICIENCY: ICD-10-CM

## 2025-06-23 DIAGNOSIS — Z79.899 HIGH RISK MEDICATION USE: ICD-10-CM

## 2025-06-23 DIAGNOSIS — I10 PRIMARY HYPERTENSION: ICD-10-CM

## 2025-06-23 DIAGNOSIS — G89.29 CHRONIC MIDLINE LOW BACK PAIN, UNSPECIFIED WHETHER SCIATICA PRESENT: ICD-10-CM

## 2025-06-23 DIAGNOSIS — M05.9 SEROPOSITIVE RHEUMATOID ARTHRITIS: Primary | ICD-10-CM

## 2025-06-23 DIAGNOSIS — M79.7 FIBROMYALGIA: ICD-10-CM

## 2025-06-23 DIAGNOSIS — Z79.899 DRUG-INDUCED IMMUNODEFICIENCY: ICD-10-CM

## 2025-06-23 DIAGNOSIS — M54.50 CHRONIC MIDLINE LOW BACK PAIN, UNSPECIFIED WHETHER SCIATICA PRESENT: ICD-10-CM

## 2025-06-23 PROCEDURE — 99213 OFFICE O/P EST LOW 20 MIN: CPT | Mod: PBBFAC | Performed by: INTERNAL MEDICINE

## 2025-06-23 PROCEDURE — 3074F SYST BP LT 130 MM HG: CPT | Mod: CPTII,,, | Performed by: INTERNAL MEDICINE

## 2025-06-23 PROCEDURE — 3008F BODY MASS INDEX DOCD: CPT | Mod: CPTII,,, | Performed by: INTERNAL MEDICINE

## 2025-06-23 PROCEDURE — 3078F DIAST BP <80 MM HG: CPT | Mod: CPTII,,, | Performed by: INTERNAL MEDICINE

## 2025-06-23 PROCEDURE — 99214 OFFICE O/P EST MOD 30 MIN: CPT | Mod: S$PBB,,, | Performed by: INTERNAL MEDICINE

## 2025-06-23 PROCEDURE — G2211 COMPLEX E/M VISIT ADD ON: HCPCS | Mod: ,,, | Performed by: INTERNAL MEDICINE

## 2025-06-23 RX ORDER — BUPRENORPHINE AND NALOXONE 2; .5 MG/1; MG/1
FILM, SOLUBLE BUCCAL; SUBLINGUAL
COMMUNITY
Start: 2025-06-06

## 2025-06-23 RX ORDER — BUPROPION HYDROCHLORIDE 300 MG/1
300 TABLET ORAL EVERY MORNING
COMMUNITY
Start: 2025-06-06

## 2025-06-23 RX ORDER — HYDROXYCHLOROQUINE SULFATE 200 MG/1
300 TABLET, FILM COATED ORAL DAILY
Qty: 50 TABLET | Refills: 6 | Status: SHIPPED | OUTPATIENT
Start: 2025-06-23

## 2025-06-23 NOTE — PROGRESS NOTES
Patient ID: [id number]     Chief Complaint: Follow-up (Patient is here for a follow up Seropositive rheumatoid arthritis. Patient denies any complaints at this time. )      HPI:     Bernice Joshi is a 40 y.o. female here today for follow up of RA.     She had seen Dr Nieves and Dr Disla. She was diagnosed with RA by Dr Nieves in 2007, she lost weight and felt better, she had COVID in 6/2022 and since then joint pain is worse.  C/o pain in back and did not do PT. Pain in hands, lower back, left jaw, neck and shoulders.  Admits swelling in hands. Morning stiffness for few hours. Subaxone helps with pain and she continues to take it, low dose Subutex with her addiction specialist Dr. Fabian in [place], she has been clean of opoids for 11+ years now (reports got addicted when first diagnosed with RA). Admits low grade fevers and fatigue. She has POTS, which is better now.  Admits chronic large bruises.     She does have HTN and HLD but improved after weight loss. Not taking any meds for HTN or HLD at this time.   Admits color changes in hands and feet, never had ulcers in finger tips or toes. Advised to take pictures next time.    She did have an emergency cholecystomy April 2023.  Overall she is doing well today.  She denies any joint pain today however does say that her back is hurting.    With the weight loss joint pain has improved.  Did discuss about starting Cimzia during her previous visit but because joint pain is better she has not started it.  She has been doing well on Plaquenil 200 mg b.i.d..  Denies red, warm and swollen joints.  Denies flares of rheumatoid arthritis.      Dr. Cuenca- OB  Dr. Paris- Fetal Maternal Specialist  Dr. Fabian- Addiction Specialist     PMH: History of Lyme's Disease, Rheumatoid arthritis, HLD, Postural orthostatic tachycardia hypotension, bipolar disorder, ADHD, substance abuse history - opioid on Suboxone without opioid use for 11 yrs, ADHD and Chronic constipation.      Denies history of fevers, rashes, photosensitivity, oral or nasal ulcers, h/o MI, stroke, seizures, h/o PE or DVT, uveitis, Raynaud's, malignancies.   Family history of autoimmune disease: Mother had RA and paternal grand mother had RA.  Pregnancies: 2 Miscarriages: 1, early first trimester.  Smoking: nonsmoker.     Social History     Tobacco Use   Smoking Status Never    Passive exposure: Past   Smokeless Tobacco Never          -------------------------------------    Arthritis    COVID    Opiate dependence    Rheumatoid arthritis, unspecified        Past Surgical History:   Procedure Laterality Date     SECTION      CHOLECYSTECTOMY  2023       Review of patient's allergies indicates:   Allergen Reactions    Cariprazine Other (See Comments)    Sulfa (sulfonamide antibiotics)      Other reaction(s): hives       Outpatient Medications Marked as Taking for the 25 encounter (Office Visit) with Jose Cruz Campoverde MD   Medication Sig Dispense Refill    buprenorphine-naloxone 2-0.5 mg (SUBOXONE) 2-0.5 mg Film SMARTSI Strip(s) By Mouth Daily      buPROPion (WELLBUTRIN XL) 300 MG 24 hr tablet Take 300 mg by mouth every morning.      cholecalciferol, vitamin D3, (VITAMIN D3) 125 mcg (5,000 unit) Tab Take 5,000 Units by mouth once daily.      cholecalciferol, vitamin D3, 125 mcg (5,000 unit) Tab Take 1 tablet by mouth every morning.      ibuprofen (ADVIL,MOTRIN) 800 MG tablet Take 1 tablet (800 mg total) by mouth 3 (three) times daily. 30 tablet 2    magnesium 250 mg Tab Take 1 tablet by mouth once.      methylphenidate HCl (RITALIN) 20 MG tablet Take 10 mg by mouth Daily.      VYVANSE 60 mg capsule Take 60 mg by mouth every morning.      [DISCONTINUED] hydroxychloroquine (PLAQUENIL) 200 mg tablet Take 1 tablet (200 mg total) by mouth 2 (two) times daily. 60 tablet 6       Social History     Socioeconomic History    Marital status: Single   Tobacco Use    Smoking status: Never     Passive exposure:  Past    Smokeless tobacco: Never   Substance and Sexual Activity    Alcohol use: Not Currently     Comment: socially    Drug use: Not Currently     Types: Oxycodone     Comment: 10 years clean from opioid dependency    Sexual activity: Yes     Partners: Male     Birth control/protection: Condom     Social Drivers of Health     Food Insecurity: No Food Insecurity (1/17/2024)    [facility]    Hunger Vital Sign     Worried About Running Out of Food in the Last Year: Never true     Ran Out of Food in the Last Year: Never true   Transportation Needs: No Transportation Needs (1/17/2024)    [facility]    PRAPARE - Transportation     Lack of Transportation (Medical): No     Lack of Transportation (Non-Medical): No   Housing Stability: Low Risk  (1/17/2024)    [facility]    Housing Stability Vital Sign     Unable to Pay for Housing in the Last Year: No     Number of Places Lived in the Last Year: 1     Unstable Housing in the Last Year: No        Family History   Problem Relation Name Age of Onset    Thyroid cancer Mother      Hypertension Mother      Hyperlipidemia Mother      Autoimmune disease Mother      Depression Father      Clotting disorder Father      Lung cancer Father          Immunization History   Administered Date(s) Administered    COVID-19 MRNA, LN-S PF (MODERNA HALF 0.25 ML DOSE) 10/22/2021    COVID-19 Vaccine 10/22/2021    COVID-19, vector-nr, rS-Ad26, PF (Nancy) 03/23/2021    DTP 1986, 1986, 1986, 10/09/1987, 09/10/1991    HIB 08/11/1988    Hepatitis B, Pediatric/Adolescent 09/15/1998, 10/30/1998, 02/19/1999    MMR 08/28/1987, 09/10/1991    OPV 1986, 1986, 1986, 10/09/1987, 09/10/1991    Td (ADULT)  "09/15/1998    Tdap 09/15/1998, 2021       Patient Care Team:  Estefani Cortez FNP as PCP - General (Family Medicine)  Jono Galvez MD as Consulting Physician (Maternal and Fetal Medicine)  Ian Gottlieb MD (Obstetrics and Gynecology)     Subjective:     Constitutional:  Denies chills. Denies fever. Denies night sweats. Denies weight loss.   Ophthalmology: Denies blurred vision. Denies dry eyes. Denies eye pain. Denies Itching and redness.   ENT: Denies oral ulcers. Denies epistaxis. Denies dry mouth. Denies swollen glands.   Endocrine: Denies diabetes. Denies thyroid Problems.   Respiratory: Denies cough. Denies shortness of breath. Denies shortness of breath with exertion. Denies hemoptysis.   Cardiovascular: Denies chest pain at rest. Denies chest pain with exertion. Denies palpitations.    Gastrointestinal: Denies abdominal pain. Denies diarrhea. Denies nausea. Denies vomiting. Denies hematemesis or hematochezia. Denies heartburn. History of constipation, using Miralax as directed to help - also s/p  13 weeks ago.   Genitourinary: Denies blood in urine.  Musculoskeletal: See HPI for details  Integumentary: Denies rash. Denies photosensitivity.   Peripheral Vascular: Denies Ulcers of hands and/or feet. Denies Cold extremities.   Neurologic: Denies dizziness. Denies headache.  Denies loss of strength.  Denies numbness or tingling to bilateral hands.   Psychiatric: Denies depression and anxiety- currently controlled. Denies suicidal/homicidal ideations.      Objective:     /67 (BP Location: Left arm, Patient Position: Sitting)   Pulse 104   Temp 98 °F (36.7 °C) (Oral)   Resp 18   Ht 5' 5" (1.651 m)   Wt 60.5 kg (133 lb 6.4 oz)   SpO2 98%   BMI 22.20 kg/m²     Physical Exam    General Appearance: alert, pleasant, in no acute distress.  Skin: Skin color, texture, turgor normal. No rashes or lesions.  Eyes:  extraocular movement intact (EOMI), conjunctiva clear.  ENT: No oral or " nasal ulcers.  Neck:  Neck supple. No adenopathy.   Lungs: CTA throughout without crackles, rhonchi, or wheezes.   Heart: RRR w/o murmurs.  No edema.   Abdomen: Soft, non-tender  Neuro: Alert, oriented, sensory and motor innervation intact.  Musculoskeletal:  No redness or warmth or swelling of any joints.  No joint deformities.  Psych: Alert, oriented, normal eye contact.      Labs:   4/2018: RF negative.   2018: RF and anti CCP negative.   5/2021: CCP negative. RF IgA negative, IgG 12 and IgM 8, normal less than 6. MAK negative.   10/10/22: CBC, CMP ok. UA ok. CRP normal. MAK negative by IFA.   10/26/2023 CMP BUN 6.3 otherwise ok. CBC overall ok. CRP 7.80 (<5), Sed Rate 13 (<13)  2/27/2024 CBC ok. CMP ok. Sed Rate 8 (<20), CRP 2.90 (<5), Hep B/C/HIV/Quantiferon negative     2/27/2024 SI Joint Xray:Impression: No acute osseous abnormality.  Left and Right Knee Xray: Impression: No acute osseous abnormality.  Right and Left Hand Xray: Impression: No acute osseous abnormality.  Left and Right Hip Xray: Impression: No acute osseous abnormality.  Lumbar Xray: Impression: No significant abnormalities identified.  No evidence of instability    1/9/22: Chest X-ray showed lungs are clear.   5/2023 ECHO LV EF 60-65%      Assessment:       ICD-10-CM ICD-9-CM   1. Seropositive rheumatoid arthritis  M05.9 714.0   2. Primary hypertension  I10 401.9   3. Fibromyalgia  M79.7 729.1   4. Drug-induced immunodeficiency  D84.821 279.3    Z79.899 E947.9   5. Chronic midline low back pain, unspecified whether sciatica present  M54.50 724.2    G89.29 338.29   6. High risk medication use  Z79.899 V58.69           Plan:     1. Seropositive rheumatoid arthritis  Low titer RF and CCP negative. She had seen Dr Nieves and Dr Disla. She was diagnosed with RA by Dr Nieves, she lost weight and felt better, she had COVID in 6/2022 and since then joint pain is worse.     - She was restarted on Plaquenil 200 mg b.i.d.    - treatment with Cimzia was  discussed in the past but she has never started it as joint pain improved with weight loss.  -she lost weight, decrease Plaquenil dose to 300 mg daily.  - no synovitis on exam, no flares of rheumatoid arthritis.  Continue with Plaquenil.  Advised to see Ophthalmology yearly for eye exam on Plaquenil.  - labs ordered.  - Back pain has improved too.     2. Other hyperlipidemia and HTN  -  Improved after weight loss, currently not taking any medications.    - Continue follow-up with PCP, blood pressure at goal today.     3. Fibromyalgia  -continue PT, continue with symptomatic management of fibromyalgia.     4. High risk medication use  -Persons with rheumatoid arthritis, lupus, psoriatic arthritis and other autoimmune diseases are at increased risk of cardiovascular disease including heart attack and stroke. We recommend that all patients with these conditions have annual health maintenance exams including lipid measurements, blood pressure measurements, and smoking cessation counseling when applicable at their primary care provider's office.   -Advised to stay up-to-date on age appropriate vaccinations and malignancy screening, including yearly skin exams.       5. Chronic midline low back pain/SI Joint Pain  - xrays unremarkable.  Back pain has improved.         Follow up in about 8 months (around 2/23/2026). In addition to their scheduled follow up, the patient has also been instructed to follow up on as needed basis.      Total time spent with patient and documentation is 35 minutes. All questions were answered to patient's satisfaction and patient verbalized understanding.

## 2025-07-23 ENCOUNTER — PATIENT MESSAGE (OUTPATIENT)
Dept: RHEUMATOLOGY | Facility: CLINIC | Age: 40
End: 2025-07-23
Payer: MEDICAID

## 2025-07-23 ENCOUNTER — TELEPHONE (OUTPATIENT)
Dept: RHEUMATOLOGY | Facility: CLINIC | Age: 40
End: 2025-07-23
Payer: MEDICAID

## 2025-07-29 NOTE — TELEPHONE ENCOUNTER
Called to communicate she has overdue labs for Provider Yeturi. Patient did not answer tc. A portal message was also previously sent. Left a vmm at this time.

## 2025-08-05 DIAGNOSIS — R17 ELEVATED BILIRUBIN: Primary | ICD-10-CM

## 2025-08-15 ENCOUNTER — HOSPITAL ENCOUNTER (OUTPATIENT)
Dept: RADIOLOGY | Facility: HOSPITAL | Age: 40
Discharge: HOME | End: 2025-08-15
Attending: PHYSICIAN ASSISTANT
Payer: MEDICAID

## 2025-08-15 DIAGNOSIS — R17 ELEVATED BILIRUBIN: ICD-10-CM

## 2025-08-15 PROCEDURE — 76705 ECHO EXAM OF ABDOMEN: CPT | Mod: TC
